# Patient Record
Sex: FEMALE | Race: WHITE | Employment: OTHER | URBAN - METROPOLITAN AREA
[De-identification: names, ages, dates, MRNs, and addresses within clinical notes are randomized per-mention and may not be internally consistent; named-entity substitution may affect disease eponyms.]

---

## 2020-03-04 ENCOUNTER — HOSPITAL ENCOUNTER (OUTPATIENT)
Dept: MAMMOGRAPHY | Age: 70
Discharge: HOME | End: 2020-03-04
Attending: INTERNAL MEDICINE
Payer: MEDICARE

## 2020-03-04 DIAGNOSIS — Z12.31 SCREENING MAMMOGRAM, ENCOUNTER FOR: ICD-10-CM

## 2020-03-04 DIAGNOSIS — M81.0 AGE RELATED OSTEOPOROSIS, UNSPECIFIED PATHOLOGICAL FRACTURE PRESENCE: ICD-10-CM

## 2020-03-04 DIAGNOSIS — M94.9 DISORDER OF BONE AND CARTILAGE: ICD-10-CM

## 2020-03-04 DIAGNOSIS — M89.9 BONE DISORDER: ICD-10-CM

## 2020-03-04 DIAGNOSIS — M85.80 OSTEOPENIA, UNSPECIFIED LOCATION: ICD-10-CM

## 2020-03-04 DIAGNOSIS — Z13.820 SCREENING FOR OSTEOPOROSIS: ICD-10-CM

## 2020-03-04 DIAGNOSIS — M89.9 DISORDER OF BONE AND CARTILAGE: ICD-10-CM

## 2020-03-04 PROCEDURE — 77063 BREAST TOMOSYNTHESIS BI: CPT

## 2020-03-04 PROCEDURE — 77080 DXA BONE DENSITY AXIAL: CPT

## 2020-12-22 ENCOUNTER — TRANSCRIBE ORDER (OUTPATIENT)
Dept: SCHEDULING | Age: 70
End: 2020-12-22

## 2020-12-22 DIAGNOSIS — Z12.31 SCREENING MAMMOGRAM FOR HIGH-RISK PATIENT: Primary | ICD-10-CM

## 2021-03-19 ENCOUNTER — HOSPITAL ENCOUNTER (OUTPATIENT)
Dept: MAMMOGRAPHY | Age: 71
Discharge: HOME | End: 2021-03-19
Attending: INTERNAL MEDICINE
Payer: MEDICARE

## 2021-03-19 DIAGNOSIS — Z12.31 SCREENING MAMMOGRAM FOR HIGH-RISK PATIENT: ICD-10-CM

## 2021-03-19 PROCEDURE — 77063 BREAST TOMOSYNTHESIS BI: CPT

## 2022-01-24 ENCOUNTER — TRANSCRIBE ORDER (OUTPATIENT)
Dept: SCHEDULING | Age: 72
End: 2022-01-24

## 2022-01-24 DIAGNOSIS — Z12.31 VISIT FOR SCREENING MAMMOGRAM: Primary | ICD-10-CM

## 2022-03-23 ENCOUNTER — HOSPITAL ENCOUNTER (OUTPATIENT)
Dept: MAMMOGRAPHY | Age: 72
Discharge: HOME | End: 2022-03-23
Attending: INTERNAL MEDICINE
Payer: MEDICARE

## 2022-03-23 DIAGNOSIS — Z12.31 VISIT FOR SCREENING MAMMOGRAM: ICD-10-CM

## 2022-03-23 PROCEDURE — 77063 BREAST TOMOSYNTHESIS BI: CPT

## 2022-06-20 RX ORDER — IRBESARTAN AND HYDROCHLOROTHIAZIDE 300; 12.5 MG/1; MG/1
1 TABLET, FILM COATED ORAL DAILY
Qty: 90 TABLET | Refills: 3 | Status: SHIPPED | OUTPATIENT
Start: 2022-06-20

## 2022-07-01 ENCOUNTER — OFFICE VISIT (OUTPATIENT)
Dept: INTERNAL MEDICINE CLINIC | Facility: CLINIC | Age: 72
End: 2022-07-01
Payer: MEDICARE

## 2022-07-01 VITALS
SYSTOLIC BLOOD PRESSURE: 130 MMHG | HEIGHT: 60 IN | WEIGHT: 136 LBS | DIASTOLIC BLOOD PRESSURE: 70 MMHG | TEMPERATURE: 98.3 F | OXYGEN SATURATION: 99 % | BODY MASS INDEX: 26.7 KG/M2 | HEART RATE: 77 BPM

## 2022-07-01 DIAGNOSIS — M85.80 OSTEOPENIA, UNSPECIFIED LOCATION: ICD-10-CM

## 2022-07-01 DIAGNOSIS — I10 PRIMARY HYPERTENSION: ICD-10-CM

## 2022-07-01 DIAGNOSIS — F51.04 PSYCHOPHYSIOLOGICAL INSOMNIA: ICD-10-CM

## 2022-07-01 DIAGNOSIS — K21.9 GASTROESOPHAGEAL REFLUX DISEASE WITHOUT ESOPHAGITIS: ICD-10-CM

## 2022-07-01 DIAGNOSIS — M85.89 OTHER SPECIFIED DISORDERS OF BONE DENSITY AND STRUCTURE, MULTIPLE SITES: ICD-10-CM

## 2022-07-01 DIAGNOSIS — G25.81 RESTLESS LEG SYNDROME: Primary | ICD-10-CM

## 2022-07-01 LAB
25(OH)D3 SERPL-MCNC: 72.6 NG/ML (ref 30–100)
ALBUMIN SERPL-MCNC: 3.8 G/DL (ref 3.2–4.6)
ALBUMIN/GLOB SERPL: 1.1 {RATIO} (ref 1.2–3.5)
ALP SERPL-CCNC: 71 U/L (ref 50–136)
ALT SERPL-CCNC: 26 U/L (ref 12–65)
ANION GAP SERPL CALC-SCNC: 6 MMOL/L (ref 7–16)
AST SERPL-CCNC: 16 U/L (ref 15–37)
BASOPHILS # BLD: 0.1 K/UL (ref 0–0.2)
BASOPHILS NFR BLD: 2 % (ref 0–2)
BILIRUB SERPL-MCNC: 0.6 MG/DL (ref 0.2–1.1)
BUN SERPL-MCNC: 23 MG/DL (ref 8–23)
CALCIUM SERPL-MCNC: 9.4 MG/DL (ref 8.3–10.4)
CHLORIDE SERPL-SCNC: 105 MMOL/L (ref 98–107)
CHOLEST SERPL-MCNC: 221 MG/DL
CO2 SERPL-SCNC: 28 MMOL/L (ref 21–32)
CREAT SERPL-MCNC: 1 MG/DL (ref 0.6–1)
DIFFERENTIAL METHOD BLD: ABNORMAL
EOSINOPHIL # BLD: 0.1 K/UL (ref 0–0.8)
EOSINOPHIL NFR BLD: 3 % (ref 0.5–7.8)
ERYTHROCYTE [DISTWIDTH] IN BLOOD BY AUTOMATED COUNT: 12.7 % (ref 11.9–14.6)
FERRITIN SERPL-MCNC: 17 NG/ML (ref 8–388)
GLOBULIN SER CALC-MCNC: 3.4 G/DL (ref 2.3–3.5)
GLUCOSE SERPL-MCNC: 90 MG/DL (ref 65–100)
HCT VFR BLD AUTO: 39.3 % (ref 35.8–46.3)
HDLC SERPL-MCNC: 109 MG/DL (ref 40–60)
HDLC SERPL: 2 {RATIO}
HGB BLD-MCNC: 12.6 G/DL (ref 11.7–15.4)
IMM GRANULOCYTES # BLD AUTO: 0 K/UL (ref 0–0.5)
IMM GRANULOCYTES NFR BLD AUTO: 0 % (ref 0–5)
IRON SERPL-MCNC: 88 UG/DL (ref 35–150)
LDLC SERPL CALC-MCNC: 103.8 MG/DL
LYMPHOCYTES # BLD: 1.5 K/UL (ref 0.5–4.6)
LYMPHOCYTES NFR BLD: 36 % (ref 13–44)
MAGNESIUM SERPL-MCNC: 2.4 MG/DL (ref 1.8–2.4)
MCH RBC QN AUTO: 29.1 PG (ref 26.1–32.9)
MCHC RBC AUTO-ENTMCNC: 32.1 G/DL (ref 31.4–35)
MCV RBC AUTO: 90.8 FL (ref 79.6–97.8)
MONOCYTES # BLD: 0.5 K/UL (ref 0.1–1.3)
MONOCYTES NFR BLD: 11 % (ref 4–12)
NEUTS SEG # BLD: 2 K/UL (ref 1.7–8.2)
NEUTS SEG NFR BLD: 48 % (ref 43–78)
NRBC # BLD: 0 K/UL (ref 0–0.2)
PLATELET # BLD AUTO: 280 K/UL (ref 150–450)
PMV BLD AUTO: 10.4 FL (ref 9.4–12.3)
POTASSIUM SERPL-SCNC: 4 MMOL/L (ref 3.5–5.1)
PROT SERPL-MCNC: 7.2 G/DL (ref 6.3–8.2)
RBC # BLD AUTO: 4.33 M/UL (ref 4.05–5.2)
SODIUM SERPL-SCNC: 139 MMOL/L (ref 136–145)
TRIGL SERPL-MCNC: 41 MG/DL (ref 35–150)
TSH, 3RD GENERATION: 1.53 UIU/ML (ref 0.36–3.74)
VIT B12 SERPL-MCNC: 414 PG/ML (ref 193–986)
VLDLC SERPL CALC-MCNC: 8.2 MG/DL (ref 6–23)
WBC # BLD AUTO: 4.2 K/UL (ref 4.3–11.1)

## 2022-07-01 PROCEDURE — 3017F COLORECTAL CA SCREEN DOC REV: CPT | Performed by: INTERNAL MEDICINE

## 2022-07-01 PROCEDURE — 1036F TOBACCO NON-USER: CPT | Performed by: INTERNAL MEDICINE

## 2022-07-01 PROCEDURE — 99214 OFFICE O/P EST MOD 30 MIN: CPT | Performed by: INTERNAL MEDICINE

## 2022-07-01 PROCEDURE — G8399 PT W/DXA RESULTS DOCUMENT: HCPCS | Performed by: INTERNAL MEDICINE

## 2022-07-01 PROCEDURE — 1123F ACP DISCUSS/DSCN MKR DOCD: CPT | Performed by: INTERNAL MEDICINE

## 2022-07-01 PROCEDURE — G8427 DOCREV CUR MEDS BY ELIG CLIN: HCPCS | Performed by: INTERNAL MEDICINE

## 2022-07-01 PROCEDURE — G8417 CALC BMI ABV UP PARAM F/U: HCPCS | Performed by: INTERNAL MEDICINE

## 2022-07-01 PROCEDURE — 1090F PRES/ABSN URINE INCON ASSESS: CPT | Performed by: INTERNAL MEDICINE

## 2022-07-01 RX ORDER — IRBESARTAN AND HYDROCHLOROTHIAZIDE 300; 12.5 MG/1; MG/1
1 TABLET, FILM COATED ORAL DAILY
Qty: 90 TABLET | Refills: 3 | Status: CANCELLED | OUTPATIENT
Start: 2022-07-01

## 2022-07-01 RX ORDER — OMEPRAZOLE 20 MG/1
20 CAPSULE, DELAYED RELEASE ORAL DAILY
Qty: 90 CAPSULE | Refills: 3 | Status: SHIPPED | OUTPATIENT
Start: 2022-07-01

## 2022-07-01 RX ORDER — ACETAMINOPHEN 160 MG
TABLET,DISINTEGRATING ORAL
COMMUNITY

## 2022-07-01 RX ORDER — RALOXIFENE HYDROCHLORIDE 60 MG/1
60 TABLET, FILM COATED ORAL DAILY
Qty: 90 TABLET | Refills: 3 | Status: SHIPPED | OUTPATIENT
Start: 2022-07-01

## 2022-07-01 ASSESSMENT — PATIENT HEALTH QUESTIONNAIRE - PHQ9
SUM OF ALL RESPONSES TO PHQ QUESTIONS 1-9: 0
2. FEELING DOWN, DEPRESSED OR HOPELESS: 0
SUM OF ALL RESPONSES TO PHQ9 QUESTIONS 1 & 2: 0
SUM OF ALL RESPONSES TO PHQ QUESTIONS 1-9: 0
SUM OF ALL RESPONSES TO PHQ QUESTIONS 1-9: 0
1. LITTLE INTEREST OR PLEASURE IN DOING THINGS: 0
SUM OF ALL RESPONSES TO PHQ QUESTIONS 1-9: 0

## 2022-07-01 ASSESSMENT — ENCOUNTER SYMPTOMS
SINUS PAIN: 0
VOMITING: 0
WHEEZING: 0
NAUSEA: 0
CONSTIPATION: 0
DIARRHEA: 0
ABDOMINAL PAIN: 0
SHORTNESS OF BREATH: 0

## 2022-07-15 ENCOUNTER — COMMUNITY OUTREACH (OUTPATIENT)
Dept: INTERNAL MEDICINE CLINIC | Facility: CLINIC | Age: 72
End: 2022-07-15

## 2022-07-15 NOTE — PROGRESS NOTES
Patient's HM shows they are current for Colorectal Screening. CareEverywhere and  files searched with success. Results attached to order and HM updated.      Colonoscopy found in Care Everywhere

## 2022-12-18 ENCOUNTER — PATIENT MESSAGE (OUTPATIENT)
Dept: INTERNAL MEDICINE CLINIC | Facility: CLINIC | Age: 72
End: 2022-12-18

## 2022-12-18 DIAGNOSIS — I10 PRIMARY HYPERTENSION: ICD-10-CM

## 2022-12-18 DIAGNOSIS — G25.81 RESTLESS LEG SYNDROME: ICD-10-CM

## 2022-12-18 DIAGNOSIS — M85.80 OSTEOPENIA, UNSPECIFIED LOCATION: ICD-10-CM

## 2022-12-19 RX ORDER — RALOXIFENE HYDROCHLORIDE 60 MG/1
60 TABLET, FILM COATED ORAL DAILY
Qty: 90 TABLET | Refills: 3 | Status: SHIPPED | OUTPATIENT
Start: 2022-12-19

## 2022-12-19 NOTE — TELEPHONE ENCOUNTER
From: Tien Reyes  To: Dr. Kennedy Damian: 12/18/2022 3:42 PM EST  Subject: raloxifene 60 MG tablet    Helmaureen Washburn on [address] would like to refill my prescription. Could you please contact them so I can get a refil there? Thank you! Colten Lemus!

## 2023-01-16 ENCOUNTER — OFFICE VISIT (OUTPATIENT)
Dept: INTERNAL MEDICINE CLINIC | Facility: CLINIC | Age: 73
End: 2023-01-16
Payer: MEDICARE

## 2023-01-16 ENCOUNTER — NURSE TRIAGE (OUTPATIENT)
Dept: OTHER | Facility: CLINIC | Age: 73
End: 2023-01-16

## 2023-01-16 VITALS
DIASTOLIC BLOOD PRESSURE: 70 MMHG | HEIGHT: 60 IN | SYSTOLIC BLOOD PRESSURE: 120 MMHG | WEIGHT: 137 LBS | BODY MASS INDEX: 26.9 KG/M2 | HEART RATE: 75 BPM | OXYGEN SATURATION: 99 %

## 2023-01-16 DIAGNOSIS — M79.2 RADICULAR PAIN IN LEFT ARM: ICD-10-CM

## 2023-01-16 DIAGNOSIS — Z78.0 POSTMENOPAUSAL: ICD-10-CM

## 2023-01-16 DIAGNOSIS — Z12.31 ENCOUNTER FOR SCREENING MAMMOGRAM FOR MALIGNANT NEOPLASM OF BREAST: ICD-10-CM

## 2023-01-16 DIAGNOSIS — M54.2 NECK PAIN ON LEFT SIDE: Primary | ICD-10-CM

## 2023-01-16 PROCEDURE — G8399 PT W/DXA RESULTS DOCUMENT: HCPCS | Performed by: NURSE PRACTITIONER

## 2023-01-16 PROCEDURE — 3017F COLORECTAL CA SCREEN DOC REV: CPT | Performed by: NURSE PRACTITIONER

## 2023-01-16 PROCEDURE — 1090F PRES/ABSN URINE INCON ASSESS: CPT | Performed by: NURSE PRACTITIONER

## 2023-01-16 PROCEDURE — 1123F ACP DISCUSS/DSCN MKR DOCD: CPT | Performed by: NURSE PRACTITIONER

## 2023-01-16 PROCEDURE — 1036F TOBACCO NON-USER: CPT | Performed by: NURSE PRACTITIONER

## 2023-01-16 PROCEDURE — G8484 FLU IMMUNIZE NO ADMIN: HCPCS | Performed by: NURSE PRACTITIONER

## 2023-01-16 PROCEDURE — G8427 DOCREV CUR MEDS BY ELIG CLIN: HCPCS | Performed by: NURSE PRACTITIONER

## 2023-01-16 PROCEDURE — 99213 OFFICE O/P EST LOW 20 MIN: CPT | Performed by: NURSE PRACTITIONER

## 2023-01-16 PROCEDURE — G8417 CALC BMI ABV UP PARAM F/U: HCPCS | Performed by: NURSE PRACTITIONER

## 2023-01-16 SDOH — ECONOMIC STABILITY: FOOD INSECURITY: WITHIN THE PAST 12 MONTHS, YOU WORRIED THAT YOUR FOOD WOULD RUN OUT BEFORE YOU GOT MONEY TO BUY MORE.: NEVER TRUE

## 2023-01-16 SDOH — ECONOMIC STABILITY: FOOD INSECURITY: WITHIN THE PAST 12 MONTHS, THE FOOD YOU BOUGHT JUST DIDN'T LAST AND YOU DIDN'T HAVE MONEY TO GET MORE.: NEVER TRUE

## 2023-01-16 ASSESSMENT — PATIENT HEALTH QUESTIONNAIRE - PHQ9
SUM OF ALL RESPONSES TO PHQ QUESTIONS 1-9: 0
SUM OF ALL RESPONSES TO PHQ QUESTIONS 1-9: 0
1. LITTLE INTEREST OR PLEASURE IN DOING THINGS: 0
SUM OF ALL RESPONSES TO PHQ QUESTIONS 1-9: 0
SUM OF ALL RESPONSES TO PHQ QUESTIONS 1-9: 0
2. FEELING DOWN, DEPRESSED OR HOPELESS: 0
SUM OF ALL RESPONSES TO PHQ9 QUESTIONS 1 & 2: 0

## 2023-01-16 ASSESSMENT — SOCIAL DETERMINANTS OF HEALTH (SDOH): HOW HARD IS IT FOR YOU TO PAY FOR THE VERY BASICS LIKE FOOD, HOUSING, MEDICAL CARE, AND HEATING?: NOT HARD AT ALL

## 2023-01-16 NOTE — TELEPHONE ENCOUNTER
Location of patient: [place]    Received call from [facility] at [facility] with [employer] Complaint. Subjective: Caller states \"It was probably in December. It's not life threatening. The tingling in my left two fingers in my left hand, every once in awhile. At night sometimes I notice it when I get into bed. It's like a burning. I know it's a nerve thing because I've had that before. I've had back surgeries on  my L4 and L5. I had some synovial cysts. \"     Current Symptoms: intermittent tingling/burning in pinky and ring fingers of left hand-NO weakness or numbness, NO back pain, NO neck stiffness    Hx of back surgeries, synovial     Onset: 1 month ago; intermittent    Associated Symptoms: NA    Pain Severity: Denies    Temperature: Denies    What has been tried: stretching, moving head back    LMP: NA Pregnant: NA    Recommended disposition: Go to Office Now. Patient agreeable. Care advice provided, patient verbalizes understanding; denies any other questions or concerns; instructed to call back for any new or worsening symptoms. Patient/Caller agrees with recommended disposition; writer provided warm transfer to [facility] [employer] at [facility] for appointment scheduling. Attention Provider: Thank you for allowing me to participate in the care of your patient. The patient was connected to triage in response to information provided to the ECC/PSC. Please do not respond through this encounter as the response is not directed to a shared pool.       Reason for Disposition   Neurologic deficit of gradual onset (e.g., days to weeks), ANY of the following: * Weakness of the face, arm, or leg on one side of the body* Numbness of the face, arm, or leg on one side of the body* Loss of speech or garbled speech    Protocols used: Neurologic Deficit-ADULT-[place]

## 2023-01-16 NOTE — PROGRESS NOTES
Rosalind Meza (:  1950) is a 67 y.o. female,Established patient, here for evaluation of the following chief complaint(s):  Hand Injury (Numbness and tingling of left hand /)         ASSESSMENT/PLAN:  1. Neck pain on left side  -     XR CERVICAL SPINE (4 OR 5 VIEWS); Future  -     [facility]  2. Postmenopausal  -     DEXA BONE DENSITY AXIAL SKELETON; Future  3. Encounter for screening mammogram for malignant neoplasm of breast  -     LIZA DIGITAL SCREEN W OR WO CAD BILATERAL; Future  4. Radicular pain in left arm    No follow-ups on file. Patient with neck pain, radicular pain with turning head to the left  Get xray  Refer to PT  Get mammogram and dexa        Subjective   SUBJECTIVE/OBJECTIVE:  Patient is here for tingling in her left fifth finger up her arm to her neck. She has had 2 back surgeries in . She has had neck issues with stiffness turning neck. She went to PT and chiropractor in the past and would get help and relief for her neck in the past. The tingling in the left finger is annoying. When she lays in the bed at night the fingers will tingle and she has to readjust position. She would like to see pT  It is tingling when she does computer work or when she lays in the bed. Review of Systems       Objective   Physical Exam  Constitutional:       Appearance: Normal appearance. HENT:      Head: Normocephalic. Right Ear: External ear normal.      Left Ear: External ear normal.   Eyes:      Extraocular Movements: Extraocular movements intact. Pupils: Pupils are equal, round, and reactive to light. Cardiovascular:      Rate and Rhythm: Normal rate and regular rhythm. Pulses: Normal pulses. Pulmonary:      Effort: Pulmonary effort is normal.   Musculoskeletal:      Cervical back: Neck supple. Neurological:      General: No focal deficit present. Mental Status: She is alert and oriented to person, place, and time. Mental status is at baseline. Sensory: No sensory deficit. Motor: No weakness. Coordination: Coordination normal.   Psychiatric:         Mood and Affect: Mood normal.         Behavior: Behavior normal.              An electronic signature was used to authenticate this note.     --AMADEO Mosquera - CNP

## 2023-01-17 ENCOUNTER — TRANSCRIBE ORDERS (OUTPATIENT)
Dept: SCHEDULING | Age: 73
End: 2023-01-17

## 2023-01-17 DIAGNOSIS — Z12.31 VISIT FOR SCREENING MAMMOGRAM: Primary | ICD-10-CM

## 2023-01-19 ENCOUNTER — HOSPITAL ENCOUNTER (OUTPATIENT)
Dept: PHYSICAL THERAPY | Age: 73
Setting detail: RECURRING SERIES
Discharge: HOME | End: 2023-01-22
Payer: MEDICARE

## 2023-01-19 PROCEDURE — 97110 THERAPEUTIC EXERCISES: CPT

## 2023-01-19 PROCEDURE — 97140 MANUAL THERAPY 1/> REGIONS: CPT

## 2023-01-19 PROCEDURE — 97162 PT EVAL MOD COMPLEX 30 MIN: CPT

## 2023-01-19 ASSESSMENT — PAIN SCALES - GENERAL: PAINLEVEL_OUTOF10: 1

## 2023-01-19 NOTE — PLAN OF CARE
Donita Lutz  : 1950  Primary: Medicare Part A And B (Medicare)  Secondary: [address] @ Marlee Lopez [address] [place] [address]  Phone: [phone]  Fax: [phone] Plan Frequency: 2 times/week  Plan of Care/Certification Expiration Date: 23    PT Visit Info:  Plan Frequency: 2 times/week  Plan of Care/Certification Expiration Date: 23    Visit Count:  1                OUTPATIENT PHYSICAL THERAPY:             OP NOTE TYPE: Initial Assessment 2023               Episode (neck pain) Appt Desk         Treatment Diagnosis:   numbness L UE  Cervicalgia (M54.2)  Medical/Referring Diagnosis:  Neck pain on left side [M54.2]  Referring Physician:  AMDAEO Padgett CNP, MD Orders:  PT Eval and Treat   Return MD Appt:  unknown  Date of Onset:       Allergies:  Patient has no known allergies. Restrictions/Precautions:           Medications Last Reviewed:  2023     SUBJECTIVE   History of Injury/Illness (Reason for Referral):  Pt reports tingling in 4th,5th fingers up to elbow. Does have neck stiffness. Sxs started in Dec.  No previous hx. At night can't get comfortable, aching in lat shoulder down the arm. 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 Patient Stated Goal(s):  \"relieve sxs\"  Initial:     1/10 Post Session:     1/10  Past Medical History/Comorbidities:   Ms. Melissa Mireles  has a past medical history of Hypertension, Insomnia, Osteopenia, and Restless leg syndrome. Ms. Melissa Mireles  has a past surgical history that includes Cataract removal (2019); Cholecystectomy; gyn (); and orthopedic surgery ().   Social History/Living Environment:   Type of Home: House  Home Layout: One level     Prior Level of Function/Work/Activity:   Occupation: Retired           Learning:   Does the patient/guardian have any barriers to learning?: No barriers  Will there be a co-learner?: No  What is the preferred language of the patient/guardian?: English  Is an  required?: No  How does the patient/guardian prefer to learn new concepts?: Listening; Reading; Demonstration; Pictures/Videos     Fall Risk Scale: Metcalf Total Score: 0  Metcalf Fall Risk: Low (0-24)     Dominant Side:  right handed      OBJECTIVE     Observation, Palpation, and Special Tests   Diminished L C7 DTR  Tightness B UT, levator, UT, scalenes     ROM   Cervical rotation:    R                                                  L                               0-50 before, 0-60 after             0-55 before, 0-67 after  Shoulder WNL     Strength   L thumb ext 4-/5, finger abd 4-/5  Others 5/5        ASSESSMENT   Initial Assessment:  pt presents with L UE N/T in ulnar nerve distribution with some cervical stiffness, weakness. She will benefit from skilled PT to address these deficits to return pt to premorbid level. Problem List: (Impacting functional limitations): Body Structures, Functions, Activity Limitations Requiring Skilled Therapeutic Intervention: Decreased functional mobility ; Decreased ROM; Decreased strength;  Increased pain     Therapy Prognosis:   Therapy Prognosis: Good     Initial Assessment Complexity:   Decision Making: Medium Complexity    PLAN   Effective Dates: 1/19/23 TO Plan of Care/Certification Expiration Date: 04/19/23   Frequency/Duration: Plan Frequency: 2 times/week   Interventions Planned (Treatment may consist of any combination of the following):    Current Treatment Recommendations: Strengthening; ROM; Manual; Pain management; Home exercise program     Goals: (Goals have been discussed and agreed upon with patient.)  Short-Term Functional Goals: Time Frame: 4 weeks  Pt to report compliance with HEP  Pt to restore 70 degrees B cervical rotations for safe driving without turning body  Discharge Goals: Time Frame: 12 weeks  Pt to report restorative sleep patterns  Pt to report min to no radiating sxs  Pt to resume normal housekeeping and cooking tasks without issues         Outcome Measure: Tool Used: Neck Disability Index (NDI)  Score:  Initial: 5/50  Most Recent: X/50 (Date: -- )   Interpretation of Score: The Neck Disability Index is a revised form of the Oswestry Low Back Pain Index and is designed to measure the activities of daily living in person's with neck pain. Each section is scored on a 0-5 scale, 5 representing the greatest disability. The scores of each section are added together for a total score of 50. Medical Necessity:   > Patient demonstrates good rehab potential due to higher previous functional level. Reason For Services/Other Comments:  > Patient continues to require skilled intervention due to inability to function sleep without L radicular arm sxs. Total Duration:  Time In: 0345  Time Out: 0500    Regarding Josey Black's therapy, I certify that the treatment plan above will be carried out by a therapist or under their direction.   Thank you for this referral,  Bran Turner, PT     Referring Physician Signature: Devi Huntley, *         Post Session Pain  Charge Capture  PT Visit Info MD Guidelines  Lola

## 2023-01-19 NOTE — PROGRESS NOTES
Ho Velasco  : 1950  Primary: Medicare Part A And B (Medicare)  Secondary: [address]  [facility] [address]  Phone: [phone]  Fax: [phone] Plan Frequency: 2 times/week    Plan of Care/Certification Expiration Date: 23      PT Visit Info:  Plan Frequency: 2 times/week  Plan of Care/Certification Expiration Date: 23      Visit Count:  1    OUTPATIENT PHYSICAL THERAPY:OP NOTE TYPE: Treatment Note 2023       Episode  }Appt Desk             Treatment Diagnosis:  numbness L UE  Cervicalgia (M54.2)    Goals: (Goals have been discussed and agreed upon with patient.)  Short-Term Functional Goals: Time Frame: 4 weeks  Pt to report compliance with HEP  Pt to restore 70 degrees B cervical rotations for safe driving without turning body  Discharge Goals: Time Frame: 12 weeks  Pt to report restorative sleep patterns  Pt to report min to no radiating sxs  Pt to resume normal housekeeping and cooking tasks without issues    Medical/Referring Diagnosis:  Neck pain on left side [M54.2]  Referring Physician:  AMADEO Ramirez CNP, MD Orders:  PT Eval and Treat   Date of Onset:  No data recorded   Allergies:   Patient has no known allergies. Restrictions/Precautions:  No data recorded  No data recorded   Interventions Planned (Treatment may consist of any combination of the following):    Current Treatment Recommendations: Strengthening; ROM; Manual; Pain management; Home exercise program     Subjective Comments: pt presents with L UE N/T in ulnar nerve distribution with some cervical stiffness, weakness.     Initial: 1/10                         Post Session:  1/10  Medications Last Reviewed:  2023  Updated Objective Findings:  See evaluation note from today    Observation, Palpation, and Special Tests   Diminished L C7 DTR  Tightness B UT, levator, UT, scalenes      ROM   Cervical rotation:    R L                               0-50 before, 0-60 after             0-55 before, 0-67 after  Shoulder WNL      Strength   L thumb ext 4-/5, finger abd 4-/5  Others 5/5     Outcome Measure: Tool Used: Neck Disability Index (NDI)  Score:  Initial: 5/50  Most Recent: X/50 (Date: -- )   Interpretation of Score: The Neck Disability Index is a revised form of the Oswestry Low Back Pain Index and is designed to measure the activities of daily living in person's with neck pain. Each section is scored on a 0-5 scale, 5 representing the greatest disability. The scores of each section are added together for a total score of 50. Treatment   THERAPEUTIC ACTIVITY: ( see below for minutes): Therapeutic activities per grid below to improve mobility. Required moderate verbal and manual cues to improve functional mobility . THERAPEUTIC EXERCISE: (see below for minutes):  Exercises per grid below to improve strength. Required moderate verbal and manual cues to promote proper body alignment, promote proper body posture, promote proper body mechanics and promote proper body breathing techniques. Progressed resistance, range, repetitions and complexity of movement as indicated. MANUAL THERAPY: (see below for minutes): Joint mobilization and Soft tissue mobilization was utilized and necessary because of the patient's restricted joint motion, painful spasm, loss of articular motion and restricted motion of soft tissue. MODALITIES: (see below for minutes):      for pain modulation    AQUATIC THERAPY (see below for minutes): Aquatic treatment performed per flow grid for Decreased muscle strength, Decreased endurance, Decreased static/dynamic balance and reactive control, Decreased activity endurance, Decompression, Ease of movement and Low impact and reduced weight bearing activity.                                           Date: 1/19/23       Modalities:                        Manual Therapy: 10 mins       STM cervical supine               Aquatics Activities:                Therapeutic Exercises: 35 mins       Pt education, postural education, HEP, functional breathing, sleep hygiene 20 mins       UT stretch Passive,  active       Levator stretch Passive,  active       PROM cervical rotations supine       Chin tuck seated               HEP: see hand out       Treatment/Session Summary:    Treatment Assessment: Pt had good understanding and corby to information presented. Communication/Consultation:  None today  Equipment provided today:  None  Recommendations/Intent for next treatment session: Next visit will focus on stretching, manual techniques, strengthening as indicated.     Total Treatment Billable Duration:  60 minutes  Time In: 0345  Time Out: 0500    Sulaiman Gallo, PT       Charge Capture  }Post Session Pain  PT Visit Info  Con[facility] Portal  MD Guidelines  Scanned Media  Benefits  Roseonlyhart    Future Appointments   Date Time Provider Bran Olea   1/23/2023  8:45 AM [facility]   1/25/2023  8:00 AM Heber Booty, PT [facility]   2/1/2023  8:45 AM Clarissa Baron, PT [facility]   2/2/2023  8:00 AM Heber Booty, PT [facility]   2/6/2023  8:45 AM Heber Booty, PT [facility]   2/8/2023  8:45 AM Heber Booty, PT [facility]   2/13/2023  8:45 AM Heber Booty, PT [facility]   2/15/2023  9:30 AM Heber Booty, PT [facility]   2/20/2023  8:45 AM Heber Booty, PT [facility]   2/22/2023  8:45 AM Heber Booty, PT [facility]   2/27/2023  8:45 AM Heber Booty, PT [facility]   3/1/2023  8:45 AM Heber Booty, PT [facility]   3/24/2023 11:30 AM SFE DEXA BI GE LUNAR DEXA [facility] E   3/24/2023 12:00 PM SFE LIZA BI ROOM 4 [facility]   6/26/2023  8:45 AM TRIM LAB RESOURCE TRIM [facility] AMB   7/3/2023  9:00 AM DO JAMIE Pickett AMB

## 2023-01-23 ENCOUNTER — HOSPITAL ENCOUNTER (OUTPATIENT)
Dept: PHYSICAL THERAPY | Age: 73
Setting detail: RECURRING SERIES
Discharge: HOME | End: 2023-01-26
Payer: MEDICARE

## 2023-01-23 PROCEDURE — 97140 MANUAL THERAPY 1/> REGIONS: CPT

## 2023-01-23 PROCEDURE — 97110 THERAPEUTIC EXERCISES: CPT

## 2023-01-23 NOTE — PROGRESS NOTES
Angel Eaton  : 1950  Primary: Medicare Part A And B (Medicare)  Secondary: [address] @ [address]  [facility] [address]  Phone: [phone]  Fax: [phone] Plan Frequency: 2 times/week    Plan of Care/Certification Expiration Date: 23      PT Visit Info:  Plan Frequency: 2 times/week  Plan of Care/Certification Expiration Date: 23      Visit Count:  2    OUTPATIENT PHYSICAL THERAPY:OP NOTE TYPE: Treatment Note 2023       Episode  }Appt Desk             Treatment Diagnosis:  numbness L UE  Cervicalgia (M54.2)    Goals: (Goals have been discussed and agreed upon with patient.)  Short-Term Functional Goals: Time Frame: 4 weeks  Pt to report compliance with HEP  Pt to restore 70 degrees B cervical rotations for safe driving without turning body  Discharge Goals: Time Frame: 12 weeks  Pt to report restorative sleep patterns  Pt to report min to no radiating sxs  Pt to resume normal housekeeping and cooking tasks without issues    Medical/Referring Diagnosis:  Neck pain on left side [M54.2]  Referring Physician:  AMADEO Mendez CNP, MD Orders:  PT Eval and Treat   Date of Onset:  No data recorded   Allergies:   Patient has no known allergies. Restrictions/Precautions:  No data recorded  No data recorded   Interventions Planned (Treatment may consist of any combination of the following):    Current Treatment Recommendations: Strengthening; ROM; Manual; Pain management; Home exercise program     Subjective Comments: I get tingling in the L arm when I lie on the R side. It goes away when I am on the L.   Initial: 1/10                         Post Session:  no increased pain with ex's  Medications Last Reviewed:  2023  Updated Objective Findings:      Observation, Palpation, and Special Tests   Diminished L C7 DTR  Tightness B UT, levator, UT, scalenes      ROM                23   Cervical rotation:    R 0-57 before            0-65 before  Shoulder WNL      Strength   L thumb ext 4-/5, finger abd 4-/5  Others 5/5     Outcome Measure: Tool Used: Neck Disability Index (NDI)  Score:  Initial: 5/50  Most Recent: X/50 (Date: -- )   Interpretation of Score: The Neck Disability Index is a revised form of the Oswestry Low Back Pain Index and is designed to measure the activities of daily living in person's with neck pain. Each section is scored on a 0-5 scale, 5 representing the greatest disability. The scores of each section are added together for a total score of 50. Treatment   THERAPEUTIC ACTIVITY: ( see below for minutes): Therapeutic activities per grid below to improve mobility. Required moderate verbal and manual cues to improve functional mobility . THERAPEUTIC EXERCISE: (see below for minutes):  Exercises per grid below to improve strength. Required moderate verbal and manual cues to promote proper body alignment, promote proper body posture, promote proper body mechanics and promote proper body breathing techniques. Progressed resistance, range, repetitions and complexity of movement as indicated. MANUAL THERAPY: (see below for minutes): Joint mobilization and Soft tissue mobilization was utilized and necessary because of the patient's restricted joint motion, painful spasm, loss of articular motion and restricted motion of soft tissue. MODALITIES: (see below for minutes):      for pain modulation    AQUATIC THERAPY (see below for minutes): Aquatic treatment performed per flow grid for Decreased muscle strength, Decreased endurance, Decreased static/dynamic balance and reactive control, Decreased activity endurance, Decompression, Ease of movement and Low impact and reduced weight bearing activity.                                           Date: 1/19/23 1/23/23  Visit 2      Modalities:                        Manual Therapy: 10 mins 15 mins STM cervical supine Supine, seated      Cervical distraction  supine              Aquatics Activities:                Therapeutic Exercises: 35 mins 30 mins      Pt education, postural education, HEP, functional breathing, sleep hygiene 20 mins       UT stretch Passive,  active passive      Levator stretch Passive,  active passive      Scalene, SCM stretches B  passive      PROM cervical rotations supine passive      Chin tuck seated supine              HEP: see hand out       Treatment/Session Summary:    Treatment Assessment: Good increase in cervical rotations. Mild reproduction of sxs with L SB/rotation. Communication/Consultation:  None today  Equipment provided today:  None  Recommendations/Intent for next treatment session: Next visit will focus on stretching, manual techniques, strengthening as indicated.     Total Treatment Billable Duration:  45 minutes  Time In: 0845  Time Out: 0930    Neeru Damon, PT       Charge Capture  }Post Session Pain  PT Visit Info  Med[employer] Portal  MD Guidelines  Scanned Media  Benefits  MyChart    Future Appointments   Date Time Provider Bran Olea   1/25/2023  8:00 AM Rolando Herron, PT [facility]   2/1/2023  8:45 AM Rolando Herron, PT [facility]   2/2/2023  8:00 AM Rolando Herron, PT [facility]   2/6/2023  8:45 AM Rolando Herron, PT [facility]   2/8/2023  8:45 AM Rolando Herron, PT [facility]   2/13/2023  8:45 AM Rolando Herron, PT [facility]   2/15/2023  9:30 AM Rolando Herron, PT [facility]   2/20/2023  8:45 AM Rolando Herron, PT [facility]   2/22/2023  8:45 AM Rolando Herron, PT [facility]   2/27/2023  8:45 AM Rolando Herron, PT [facility]   3/1/2023  8:45 AM Rolando Herron, PT [facility]   3/24/2023 11:30 AM SFE DEXA BI GE LUNAR DEXA [facility]   3/24/2023 12:00 PM SFE LIZA BI ROOM 4 [facility] SFE   6/26/2023  8:45 AM TRIM LAB RESOURCE TRIM [facility] AMB   7/3/2023  9:00 AM Rigo Shukla,  TRIM [facility] AMB

## 2023-01-25 ENCOUNTER — HOSPITAL ENCOUNTER (OUTPATIENT)
Dept: PHYSICAL THERAPY | Age: 73
Setting detail: RECURRING SERIES
Discharge: HOME | End: 2023-01-28
Payer: MEDICARE

## 2023-01-25 PROCEDURE — 97140 MANUAL THERAPY 1/> REGIONS: CPT

## 2023-01-25 PROCEDURE — 97110 THERAPEUTIC EXERCISES: CPT

## 2023-01-25 NOTE — PROGRESS NOTES
Ho Velasco  : 1950  Primary: Medicare Part A And B (Medicare)  Secondary: [address] @ [address]  [facility] [address]  Phone: [phone]  Fax: [phone] Plan Frequency: 2 times/week    Plan of Care/Certification Expiration Date: 23      PT Visit Info:  Plan Frequency: 2 times/week  Plan of Care/Certification Expiration Date: 23      Visit Count:  3    OUTPATIENT PHYSICAL THERAPY:OP NOTE TYPE: Treatment Note 2023       Episode  }Appt Desk             Treatment Diagnosis:  numbness L UE  Cervicalgia (M54.2)    Goals: (Goals have been discussed and agreed upon with patient.)  Short-Term Functional Goals: Time Frame: 4 weeks  Pt to report compliance with HEP  Pt to restore 70 degrees B cervical rotations for safe driving without turning body  Discharge Goals: Time Frame: 12 weeks  Pt to report restorative sleep patterns  Pt to report min to no radiating sxs  Pt to resume normal housekeeping and cooking tasks without issues    Medical/Referring Diagnosis:  Neck pain on left side [M54.2]  Referring Physician:  AMADEO Ramirez CNP, MD Orders:  PT Eval and Treat   Date of Onset:  No data recorded   Allergies:   Patient has no known allergies. Restrictions/Precautions:  No data recorded  No data recorded   Interventions Planned (Treatment may consist of any combination of the following):    Current Treatment Recommendations: Strengthening; ROM; Manual; Pain management; Home exercise program     Subjective Comments: My neck is moving so much more. I still get tingling. It happens at all kinds of times. I am resting better.    Initial: 1/10                         Post Session:  no increased pain with ex's  Medications Last Reviewed:  2023  Updated Objective Findings:      Observation, Palpation, and Special Tests   Diminished L C7 DTR  Tightness B UT, levator, UT, scalenes      ROM                23 Cervical rotation:    R                                                               0-55 before            0-70 before  Shoulder WNL      Strength   L thumb ext 4-/5, finger abd 4-/5  Others 5/5     Outcome Measure: Tool Used: Neck Disability Index (NDI)  Score:  Initial: 5/50  Most Recent: X/50 (Date: -- )   Interpretation of Score: The Neck Disability Index is a revised form of the Oswestry Low Back Pain Index and is designed to measure the activities of daily living in person's with neck pain. Each section is scored on a 0-5 scale, 5 representing the greatest disability. The scores of each section are added together for a total score of 50. Treatment   THERAPEUTIC ACTIVITY: ( see below for minutes): Therapeutic activities per grid below to improve mobility. Required moderate verbal and manual cues to improve functional mobility . THERAPEUTIC EXERCISE: (see below for minutes):  Exercises per grid below to improve strength. Required moderate verbal and manual cues to promote proper body alignment, promote proper body posture, promote proper body mechanics and promote proper body breathing techniques. Progressed resistance, range, repetitions and complexity of movement as indicated. MANUAL THERAPY: (see below for minutes): Joint mobilization and Soft tissue mobilization was utilized and necessary because of the patient's restricted joint motion, painful spasm, loss of articular motion and restricted motion of soft tissue. MODALITIES: (see below for minutes):      for pain modulation    AQUATIC THERAPY (see below for minutes): Aquatic treatment performed per flow grid for Decreased muscle strength, Decreased endurance, Decreased static/dynamic balance and reactive control, Decreased activity endurance, Decompression, Ease of movement and Low impact and reduced weight bearing activity.                                           Date: 1/19/23 1/23/23  Visit 2 1/25/23  Visit 3     Modalities: Manual Therapy: 10 mins 15 mins 20 mins     STM cervical supine Supine, seated Supine, seated     Cervical distraction  supine supine             Aquatics Activities:                Therapeutic Exercises: 35 mins 30 mins 40 mins     Pt education, postural education, HEP, functional breathing, sleep hygiene 20 mins  Checked sleeping posture with pillows from home     UT stretch Passive,  active passive passive     Levator stretch Passive,  active passive passive     Scalene, SCM stretches B  passive passive     PROM cervical rotations supine passive passive     Chin tuck seated supine supine             HEP: see hand out       Treatment/Session Summary:    Treatment Assessment: Improved ROM and sxs reduction. Some improved strength in finger abd and thumb ext. Pt has good pillow height and neck support. She demonstrated lying prone with head rotated L which triggered tingling L UE. Reminded pt that L rotation/SB/ext are postures to avoid as these are provocative. She did not associate L rotation in prone as being the same as L rotation in sitting. Communication/Consultation:  None today  Equipment provided today:  None  Recommendations/Intent for next treatment session: Next visit will focus on stretching, manual techniques, strengthening as indicated.     Total Treatment Billable Duration:  60 minutes  Time In: 0800  Time Out: 0900    Sherryle Pringle, PT       Charge Capture  }Post Session Pain  PT Visit Info  [facility] Portal  MD Guidelines  Scanned Media  Benefits  MyChart    Future Appointments   Date Time Provider Bran Olea   2/1/2023  8:45 AM Nani Davis, PT [facility]   2/2/2023  8:00 AM Nani Davis, PT [facility]   2/6/2023  [facility]   2/8/2023  8:45 AM Nani Susan, PT [facility]   2/13/2023  8:45 AM Nani Susan, PT [facility]   2/15/2023  9:30 AM Nani [place], PT [facility]   2/20/2023  8:45 AM [facility]   2/22/2023  8:45 AM Harman Mondragon Loraine, PT [facility]   2/27/2023  8:45 AM Doreene Duverney, PT [facility]   3/1/2023  8:45 AM Doreene Duverney, PT [facility]   3/24/2023 11:30 AM [facility] DEXA BI GE LUNAR DEXA [facility]   3/24/2023 12:00 PM SFE LIZA BI ROOM 4 [facility]FE   6/26/2023  8:45 AM TRIM LAB RESOURCE TRIM [facility] AMB   7/3/2023  9:00 AM Rigo Shukla,  [facility] AM[facility]

## 2023-02-01 ENCOUNTER — HOSPITAL ENCOUNTER (OUTPATIENT)
Dept: PHYSICAL THERAPY | Age: 73
Setting detail: RECURRING SERIES
Discharge: HOME | End: 2023-02-04
Payer: MEDICARE

## 2023-02-01 PROCEDURE — 97110 THERAPEUTIC EXERCISES: CPT

## 2023-02-01 PROCEDURE — 97140 MANUAL THERAPY 1/> REGIONS: CPT

## 2023-02-01 NOTE — PROGRESS NOTES
Jil Snyder  : 1950  Primary: Medicare Part A And B (Medicare)  Secondary: [address] @ [address]  [facility] [address]  Phone: [phone]  Fax: [phone] Plan Frequency: 2 times/week    Plan of Care/Certification Expiration Date: 23      PT Visit Info:  Plan Frequency: 2 times/week  Plan of Care/Certification Expiration Date: 23      Visit Count:  4    OUTPATIENT PHYSICAL THERAPY:OP NOTE TYPE: Treatment Note 2023       Episode  }Appt Desk             Treatment Diagnosis:  numbness L UE  Cervicalgia (M54.2)    Goals: (Goals have been discussed and agreed upon with patient.)  Short-Term Functional Goals: Time Frame: 4 weeks  Pt to report compliance with HEP  Pt to restore 70 degrees B cervical rotations for safe driving without turning body  Discharge Goals: Time Frame: 12 weeks  Pt to report restorative sleep patterns  Pt to report min to no radiating sxs  Pt to resume normal housekeeping and cooking tasks without issues    Medical/Referring Diagnosis:  Neck pain on left side [M54.2]  Referring Physician:  AMADEO Dobbins CNP, MD Orders:  PT Eval and Treat   Date of Onset:  No data recorded   Allergies:   Patient has no known allergies. Restrictions/Precautions:  No data recorded  No data recorded   Interventions Planned (Treatment may consist of any combination of the following):    Current Treatment Recommendations: Strengthening; ROM; Manual; Pain management; Home exercise program     Subjective Comments:  I had a pull down my neck last night. I am waking up with tingling. I get so tired of sleeping on my left side.   Initial: 1/10                         Post Session:  no increased pain with ex's  Medications Last Reviewed:  2023  Updated Objective Findings:      Observation, Palpation, and Special Tests   Diminished L C7 DTR  Tightness B UT, levator, UT, scalenes      ROM               23 Cervical rotation:    R                                                               0-65 before            0-70 before  Shoulder WNL      Strength   L thumb ext 4-/5, finger abd 4-/5  Others 5/5     Outcome Measure: Tool Used: Neck Disability Index (NDI)  Score:  Initial: 5/50  Most Recent: X/50 (Date: -- )   Interpretation of Score: The Neck Disability Index is a revised form of the Oswestry Low Back Pain Index and is designed to measure the activities of daily living in person's with neck pain. Each section is scored on a 0-5 scale, 5 representing the greatest disability. The scores of each section are added together for a total score of 50. Treatment   THERAPEUTIC ACTIVITY: ( see below for minutes): Therapeutic activities per grid below to improve mobility. Required moderate verbal and manual cues to improve functional mobility . THERAPEUTIC EXERCISE: (see below for minutes):  Exercises per grid below to improve strength. Required moderate verbal and manual cues to promote proper body alignment, promote proper body posture, promote proper body mechanics and promote proper body breathing techniques. Progressed resistance, range, repetitions and complexity of movement as indicated. MANUAL THERAPY: (see below for minutes): Joint mobilization and Soft tissue mobilization was utilized and necessary because of the patient's restricted joint motion, painful spasm, loss of articular motion and restricted motion of soft tissue. MODALITIES: (see below for minutes):      for pain modulation    AQUATIC THERAPY (see below for minutes): Aquatic treatment performed per flow grid for Decreased muscle strength, Decreased endurance, Decreased static/dynamic balance and reactive control, Decreased activity endurance, Decompression, Ease of movement and Low impact and reduced weight bearing activity.                                           Date: 1/19/23 1/23/23  Visit 2 1/25/23  Visit 3 2/1/23  Visit 4 Modalities:                Manual Therapy: 10 mins 15 mins 20 mins 30 mins    STM cervical supine Supine, seated Supine, seated seated    Cervical distraction  supine supine             Aquatics Activities:                Therapeutic Exercises: 35 mins 30 mins 40 mins 15 mins    Pt education, postural education, HEP, functional breathing, sleep hygiene 20 mins  Checked sleeping posture with pillows from home     UT stretch Passive,  active passive passive passive    Levator stretch Passive,  active passive passive passive    Scalene, SCM stretches B  passive passive passive    PROM cervical rotations supine passive passive seated    Chin tuck seated supine supine seated            HEP: see hand out       Treatment/Session Summary:    Treatment Assessment:  Increased R cervical rotation. Reviewed avoiding L cervical SB/rotation joseph when radicular sxs felt. Communication/Consultation:  None today  Equipment provided today:  None  Recommendations/Intent for next treatment session: Next visit will focus on stretching, manual techniques, strengthening as indicated.     Total Treatment Billable Duration:  45 minutes  Time In: 0845  Time Out: 0930    Artie Wallace, PT       Charge Capture  }Post Session Pain  PT Visit Info  [employer] Portal  MD Guidelines  Scanned Media  Benefits  MyChart    Future Appointments   Date Time Provider Bran Olea   2/2/2023  8:00 AM Dipesh Hurtado, PT [facility]   2/6/2023  8:45 AM Dipesh Hurtado, PT [facility]   2/8/2023  8:45 AM Dipesh Hurtado, PT [facility]   2/13/2023  8:45 AM Dipesh Hurtado, PT [facility]   2/15/2023  9:30 AM Dipesh Hurtado, PT [facility]   2/20/2023  8:45 AM Dipesh Hurtado, PT [facility]   2/22/2023  8:45 AM Dipesh Hurtado, PT [facility]   2/27/2023  8:45 AM Dipesh Hurtado, PT [facility]   3/1/2023  8:45 AM Dipesh Hurtado, PT [facility]   3/24/2023 11:30 AM SFE DEXA BI GE LUNAR DEXA [facility] SFE   3/24/2023 12:00 PM SFE LIZA BI ROOM 4 [facility] SFE   6/26/2023  8:45 AM TRIM LAB RESOURCE TRIM [facility] AMB   7/3/2023  9:00 AM Rigo Shukla,  TR[facility] AMB

## 2023-02-02 ENCOUNTER — HOSPITAL ENCOUNTER (OUTPATIENT)
Dept: PHYSICAL THERAPY | Age: 73
Setting detail: RECURRING SERIES
Discharge: HOME | End: 2023-02-05
Payer: MEDICARE

## 2023-02-02 PROCEDURE — 97110 THERAPEUTIC EXERCISES: CPT

## 2023-02-02 PROCEDURE — 97140 MANUAL THERAPY 1/> REGIONS: CPT

## 2023-02-02 NOTE — PROGRESS NOTES
Maggie Maynard  : 1950  Primary: Medicare Part A And B (Medicare)  Secondary: [address] @ [address]  [facility] [address]  Phone: [phone]  Fax: [phone] Plan Frequency: 2 times/week    Plan of Care/Certification Expiration Date: 23      PT Visit Info:  Plan Frequency: 2 times/week  Plan of Care/Certification Expiration Date: 23      Visit Count:  5    OUTPATIENT PHYSICAL THERAPY:OP NOTE TYPE: Treatment Note 2023       Episode  }Appt Desk             Treatment Diagnosis:  numbness L UE  Cervicalgia (M54.2)    Goals: (Goals have been discussed and agreed upon with patient.)  Short-Term Functional Goals: Time Frame: 4 weeks  Pt to report compliance with HEP  Pt to restore 70 degrees B cervical rotations for safe driving without turning body  Discharge Goals: Time Frame: 12 weeks  Pt to report restorative sleep patterns  Pt to report min to no radiating sxs  Pt to resume normal housekeeping and cooking tasks without issues    Medical/Referring Diagnosis:  Neck pain on left side [M54.2]  Referring Physician:  AMADEO Gutierrez CNP, MD Orders:  PT Eval and Treat   Date of Onset:  No data recorded   Allergies:   Patient has no known allergies. Restrictions/Precautions:  No data recorded  No data recorded   Interventions Planned (Treatment may consist of any combination of the following):    Current Treatment Recommendations: Strengthening; ROM; Manual; Pain management; Home exercise program     Subjective Comments: It is doing better.     Initial: 1/10                         Post Session:  no increased pain with ex's  Medications Last Reviewed:  2023  Updated Objective Findings:      Observation, Palpation, and Special Tests   Diminished L C7 DTR  Tightness B UT, levator, UT, scalenes      ROM               23   Cervical rotation:    R                                                               0-70 before            0-70 before  Shoulder WNL      Strength   5/5 MMT L UE     Outcome Measure: Tool Used: Neck Disability Index (NDI)  Score:  Initial: 5/50  Most Recent: X/50 (Date: -- )   Interpretation of Score: The Neck Disability Index is a revised form of the Oswestry Low Back Pain Index and is designed to measure the activities of daily living in person's with neck pain. Each section is scored on a 0-5 scale, 5 representing the greatest disability. The scores of each section are added together for a total score of 50. Treatment   THERAPEUTIC ACTIVITY: ( see below for minutes): Therapeutic activities per grid below to improve mobility. Required moderate verbal and manual cues to improve functional mobility . THERAPEUTIC EXERCISE: (see below for minutes):  Exercises per grid below to improve strength. Required moderate verbal and manual cues to promote proper body alignment, promote proper body posture, promote proper body mechanics and promote proper body breathing techniques. Progressed resistance, range, repetitions and complexity of movement as indicated. MANUAL THERAPY: (see below for minutes): Joint mobilization and Soft tissue mobilization was utilized and necessary because of the patient's restricted joint motion, painful spasm, loss of articular motion and restricted motion of soft tissue. MODALITIES: (see below for minutes):      for pain modulation    AQUATIC THERAPY (see below for minutes): Aquatic treatment performed per flow grid for Decreased muscle strength, Decreased endurance, Decreased static/dynamic balance and reactive control, Decreased activity endurance, Decompression, Ease of movement and Low impact and reduced weight bearing activity.                                           Date: 1/19/23 1/23/23  Visit 2 1/25/23  Visit 3 2/1/23  Visit 4 2/2/23  Visit 5    Modalities:                  Manual Therapy: 10 mins 15 mins 20 mins 30 mins 30 mins    STM cervical supine Supine, seated Supine, seated seated seated    Cervical distraction  supine supine               Aquatics Activities:                  Therapeutic Exercises: 35 mins 30 mins 40 mins 15 mins 15 mins    Pt education, postural education, HEP, functional breathing, sleep hygiene 20 mins  Checked sleeping posture with pillows from home      UT stretch Passive,  active passive passive passive passive    Levator stretch Passive,  active passive passive passive passive    Scalene, SCM stretches B  passive passive passive passive    PROM cervical rotations supine passive passive seated seated    Chin tuck seated supine supine seated seated             HEP: see hand out       Treatment/Session Summary:    Treatment Assessment:   Good response with normalized L UE strength. Communication/Consultation:  None today  Equipment provided today:  None  Recommendations/Intent for next treatment session: Next visit will focus on stretching, manual techniques, strengthening as indicated.     Total Treatment Billable Duration:  45 minutes  Time In: 0800  Time Out: 0845    Tray York PT       Charge Capture  }Post Session Pain  PT Visit Info  [employer] Portal  MD Guidelines  Scanned Media  Benefits  MyChart    Future Appointments   Date Time Provider Bran Olea   2/6/2023  8:45 AM [facility], PT [facility]   2/8/2023  8:45 [facility]   2/13/2023  8:45 AM [facility], PT [facility]   2/15/2023  9:30 AM [place] [facility]   2/20/2023  8:45 AM [place] [facility]   2/22/2023  8:45 AM [facility], PT [facility]   2/27/2023  8:45 AM [place] [facility]   3/1/2023  8:45 AM [place] [facility]   3/24/2023 11:30 AM SFE DEXA BI GE LUNAR DEXA [facility]   3/24/2023 12:00 PM SFE LIZA BI ROOM 4 [facility] SFE   6/26/2023  8:45 AM TRIM LAB RESOURCE TRIM [facility] AMB   7/3/2023  9:00 AM Rigo Shukla,  TRIM [facility] AMB

## 2023-02-06 ENCOUNTER — HOSPITAL ENCOUNTER (OUTPATIENT)
Dept: PHYSICAL THERAPY | Age: 73
Setting detail: RECURRING SERIES
Discharge: HOME | End: 2023-02-09
Payer: MEDICARE

## 2023-02-06 PROCEDURE — 97110 THERAPEUTIC EXERCISES: CPT

## 2023-02-06 PROCEDURE — 97140 MANUAL THERAPY 1/> REGIONS: CPT

## 2023-02-06 NOTE — PROGRESS NOTES
Doris Woodard  : 1950  Primary: Medicare Part A And B (Medicare)  Secondary: [address] @ [address]  [facility] [address]  Phone: [phone]  Fax: [phone] Plan Frequency: 2 times/week    Plan of Care/Certification Expiration Date: 23      PT Visit Info:  Plan Frequency: 2 times/week  Plan of Care/Certification Expiration Date: 23      Visit Count:  6    OUTPATIENT PHYSICAL THERAPY:OP NOTE TYPE: Treatment Note 2023       Episode  }Appt Desk             Treatment Diagnosis:  numbness L UE  Cervicalgia (M54.2)    Goals: (Goals have been discussed and agreed upon with patient.)  Short-Term Functional Goals: Time Frame: 4 weeks  Pt to report compliance with HEP  Pt to restore 70 degrees B cervical rotations for safe driving without turning body  Discharge Goals: Time Frame: 12 weeks  Pt to report restorative sleep patterns  Pt to report min to no radiating sxs  Pt to resume normal housekeeping and cooking tasks without issues    Medical/Referring Diagnosis:  Neck pain on left side [M54.2]  Referring Physician:  AMADEO Keen CNP, MD Orders:  PT Eval and Treat   Date of Onset:  No data recorded   Allergies:   Patient has no known allergies. Restrictions/Precautions:  No data recorded  No data recorded   Interventions Planned (Treatment may consist of any combination of the following):    Current Treatment Recommendations: Strengthening; ROM; Manual; Pain management; Home exercise program     Subjective Comments:   I think I know how to get out of it when it gets worse but it is always there to some degree.     Initial: 1/10                         Post Session:  no increased pain with ex's  Medications Last Reviewed:  2023  Updated Objective Findings:      Observation, Palpation, and Special Tests   Diminished L C7 DTR  Tightness B UT, levator, UT, scalenes      ROM               23   Cervical rotation: R                                                               0-70 before            0-70 before  Shoulder WNL      Strength   5/5 MMT L UE     Outcome Measure: Tool Used: Neck Disability Index (NDI)  Score:  Initial: 5/50  Most Recent: X/50 (Date: -- )   Interpretation of Score: The Neck Disability Index is a revised form of the Oswestry Low Back Pain Index and is designed to measure the activities of daily living in person's with neck pain. Each section is scored on a 0-5 scale, 5 representing the greatest disability. The scores of each section are added together for a total score of 50. Treatment   THERAPEUTIC ACTIVITY: ( see below for minutes): Therapeutic activities per grid below to improve mobility. Required moderate verbal and manual cues to improve functional mobility . THERAPEUTIC EXERCISE: (see below for minutes):  Exercises per grid below to improve strength. Required moderate verbal and manual cues to promote proper body alignment, promote proper body posture, promote proper body mechanics and promote proper body breathing techniques. Progressed resistance, range, repetitions and complexity of movement as indicated. MANUAL THERAPY: (see below for minutes): Joint mobilization and Soft tissue mobilization was utilized and necessary because of the patient's restricted joint motion, painful spasm, loss of articular motion and restricted motion of soft tissue. MODALITIES: (see below for minutes):      for pain modulation    AQUATIC THERAPY (see below for minutes): Aquatic treatment performed per flow grid for Decreased muscle strength, Decreased endurance, Decreased static/dynamic balance and reactive control, Decreased activity endurance, Decompression, Ease of movement and Low impact and reduced weight bearing activity.                                           Date: 1/19/23 1/23/23  Visit 2 1/25/23  Visit 3 2/1/23  Visit 4 2/2/23  Visit 5 2/6/23  Visit 6   Modalities: Manual Therapy: 10 mins 15 mins 20 mins 30 mins 30 mins 30 mins   STM cervical supine Supine, seated Supine, seated seated seated Seated, supine   Cervical distraction  supine supine   supine            Aquatics Activities:                  Therapeutic Exercises: 35 mins 30 mins 40 mins 15 mins 15 mins 15 mins   Pt education, postural education, HEP, functional breathing, sleep hygiene 20 mins  Checked sleeping posture with pillows from home      UT stretch Passive,  active passive passive passive passive passive   Levator stretch Passive,  active passive passive passive passive passive   Scalene, SCM stretches B  passive passive passive passive passive   PROM cervical rotations supine passive passive seated seated Seated, supine   Chin tuck seated supine supine seated seated Seated, supine            HEP: see hand out       Treatment/Session Summary:    Treatment Assessment:   Tight L levator. Reminded of home levator stretches. Cues needed not to splint cervical posture. Communication/Consultation:  None today  Equipment provided today:  None  Recommendations/Intent for next treatment session: Next visit will focus on stretching, manual techniques, strengthening as indicated.     Total Treatment Billable Duration:  45 minutes  Time In: 0845  Time Out: 0930    Alex Sherman, PT       Charge Capture  }Post Session Pain  PT Visit Info  [employer] Portal  MD Guidelines  Scanned Media  Benefits  MyChart    Future Appointments   Date Time Provider Bran Olea   2/8/2023  8:45 AM Mehreen Slate, PT [facility]   2/13/2023  8:45 AM Mehreen Slate, PT [facility]   2/15/2023  9:30 AM Mehreen Slate, PT [facility]   2/20/2023  8:45 AM Mehreen Slate, PT [facility]   2/22/2023  8:45 AM Mehreen Slate, PT [facility]   2/27/2023  8:45 AM Mehreen Slate, PT [facility]   3/1/2023  8:45 AM Mehreen Slate, PT [facility]   3/24/2023 11:30 AM SFE DEXA BI GE LUNAR DEXA [facility]   3/24/2023 12:00 PM SFE LIZA BI ROOM 4 [facility] 6/26/2023  8:45 AM TRIM LAB RESOURCE TRIM [facility]   7/3/2023  9:00 AM DO JAMIE Pickett [facility] AM[facility]

## 2023-02-08 ENCOUNTER — HOSPITAL ENCOUNTER (OUTPATIENT)
Dept: PHYSICAL THERAPY | Age: 73
Setting detail: RECURRING SERIES
Discharge: HOME | End: 2023-02-11
Payer: MEDICARE

## 2023-02-08 PROCEDURE — 97140 MANUAL THERAPY 1/> REGIONS: CPT

## 2023-02-08 PROCEDURE — 97110 THERAPEUTIC EXERCISES: CPT

## 2023-02-08 NOTE — PROGRESS NOTES
Natty Sweet  : 1950  Primary: Medicare Part A And B (Medicare)  Secondary: [address] @ [address]  [facility] [address]  Phone: [phone]  Fax: [phone] Plan Frequency: 2 times/week    Plan of Care/Certification Expiration Date: 23      PT Visit Info:  Plan Frequency: 2 times/week  Plan of Care/Certification Expiration Date: 23      Visit Count:  7    OUTPATIENT PHYSICAL THERAPY:OP NOTE TYPE: Treatment Note 2023       Episode  }Appt Desk             Treatment Diagnosis:  numbness L UE  Cervicalgia (M54.2)    Goals: (Goals have been discussed and agreed upon with patient.)  Short-Term Functional Goals: Time Frame: 4 weeks  Pt to report compliance with HEP  Pt to restore 70 degrees B cervical rotations for safe driving without turning body  Discharge Goals: Time Frame: 12 weeks  Pt to report restorative sleep patterns  Pt to report min to no radiating sxs  Pt to resume normal housekeeping and cooking tasks without issues    Medical/Referring Diagnosis:  Neck pain on left side [M54.2]  Referring Physician:  AMADEO Manuel CNP, MD Orders:  PT Eval and Treat   Date of Onset:  No data recorded   Allergies:   Patient has no known allergies. Restrictions/Precautions:  No data recorded  No data recorded   Interventions Planned (Treatment may consist of any combination of the following):    Current Treatment Recommendations: Strengthening; ROM; Manual; Pain management; Home exercise program     Subjective Comments:   I am not waking up because of the arm now. I still feel it sometimes but not as much.   Initial: 0/10                         Post Session:  no increased pain with ex's  Medications Last Reviewed:  2023  Updated Objective Findings:      Observation, Palpation, and Special Tests   Diminished L C7 DTR  Tightness B UT, levator, UT, scalenes      ROM               23   Cervical rotation:    R 0-70 before            0-70 before  Shoulder WNL        Outcome Measure: Tool Used: Neck Disability Index (NDI)  Score:  Initial: 5/50  Most Recent: X/50 (Date: -- )   Interpretation of Score: The Neck Disability Index is a revised form of the Oswestry Low Back Pain Index and is designed to measure the activities of daily living in person's with neck pain. Each section is scored on a 0-5 scale, 5 representing the greatest disability. The scores of each section are added together for a total score of 50. Treatment   THERAPEUTIC ACTIVITY: ( see below for minutes): Therapeutic activities per grid below to improve mobility. Required moderate verbal and manual cues to improve functional mobility . THERAPEUTIC EXERCISE: (see below for minutes):  Exercises per grid below to improve strength. Required moderate verbal and manual cues to promote proper body alignment, promote proper body posture, promote proper body mechanics and promote proper body breathing techniques. Progressed resistance, range, repetitions and complexity of movement as indicated. MANUAL THERAPY: (see below for minutes): Joint mobilization and Soft tissue mobilization was utilized and necessary because of the patient's restricted joint motion, painful spasm, loss of articular motion and restricted motion of soft tissue. MODALITIES: (see below for minutes):      for pain modulation    AQUATIC THERAPY (see below for minutes): Aquatic treatment performed per flow grid for Decreased muscle strength, Decreased endurance, Decreased static/dynamic balance and reactive control, Decreased activity endurance, Decompression, Ease of movement and Low impact and reduced weight bearing activity.                                           Date: 2/2/23  Visit 5 2/6/23  Visit 6 2/8/23  Visit 7    Modalities:              Manual Therapy: 30 mins 30 mins 30 mins    STM cervical seated Seated, supine Seated Cervical distraction  supine            Aquatics Activities:              Therapeutic Exercises: 15 mins 15 mins 15 mins    Pt education, postural education, HEP, functional breathing, sleep hygiene       UT stretch passive passive passive    Levator stretch passive passive passive    Scalene, SCM stretches B passive passive passive    PROM cervical rotations seated Seated, supine seated    Chin tuck seated Seated, supine seated           HEP: see hand out       Treatment/Session Summary:    Treatment Assessment:   Mild L levator tightness. ROM remains full B. Communication/Consultation:  None today  Equipment provided today:  None  Recommendations/Intent for next treatment session: Next visit will focus on stretching, manual techniques, strengthening as indicated.     Total Treatment Billable Duration:  45 minutes  Time In: 0845  Time Out: 0930    Sommer Camacho, PT       Charge Capture  }Post Session Pain  PT Visit Info  [employer] Portal  MD Guidelines  Scanned Media  Benefits  MyChart    Future Appointments   Date Time Provider Bran Olea   2/13/2023  1:30 PM Miri Avina, PT [facility]   2/15/2023  9:30 AM Miri Avina, PT [facility]   2/20/2023  8:45 AM Miri Avina, PT [facility]   2/22/2023  8:45 AM Miri Avina, PT [facility]   2/27/2023  8:45 AM Miri Avina, PT [facility]   3/1/2023  8:45 AM Miri Avina, PT [facility]   3/24/2023 11:30 AM SFE DEXA BI GE LUNAR DEXA [facility]   3/24/2023 12:00 PM SFE LIZA BI ROOM 4 [facility]   6/26/2023  8:45 AM TRIM LAB RESOURCE TRIM [facility]   7/3/2023  9:00 AM Rigo Shukla,  TRIM [facility] AMB

## 2023-02-13 ENCOUNTER — HOSPITAL ENCOUNTER (OUTPATIENT)
Dept: PHYSICAL THERAPY | Age: 73
Setting detail: RECURRING SERIES
Discharge: HOME | End: 2023-02-16
Payer: MEDICARE

## 2023-02-13 PROCEDURE — 97140 MANUAL THERAPY 1/> REGIONS: CPT

## 2023-02-13 PROCEDURE — 97110 THERAPEUTIC EXERCISES: CPT

## 2023-02-13 NOTE — PROGRESS NOTES
Yuni Sanchez  : 1950  Primary: Medicare Part A And B (Medicare)  Secondary: [address] @ [address]  [facility] [address]  Phone: [phone]  Fax: [phone] Plan Frequency: 2 times/week    Plan of Care/Certification Expiration Date: 23      PT Visit Info:  Plan Frequency: 2 times/week  Plan of Care/Certification Expiration Date: 23      Visit Count:  8    OUTPATIENT PHYSICAL THERAPY:OP NOTE TYPE: Treatment Note and Progress Note 2023       Episode  }Appt Desk             Treatment Diagnosis:  numbness L UE  Cervicalgia (M54.2)    Goals: (Goals have been discussed and agreed upon with patient.)  Short-Term Functional Goals: Time Frame: 4 weeks  Pt to report compliance with HEP - MET  Pt to restore 70 degrees B cervical rotations for safe driving without turning body - MET  Discharge Goals: Time Frame: 12 weeks  Pt to report restorative sleep patterns - ongoing  Pt to report min to no radiating sxs - ongoing  Pt to resume normal housekeeping and cooking tasks without issues - ongoing    Medical/Referring Diagnosis:  Neck pain on left side [M54.2]  Referring Physician:  AMADEO Rivera CNP, MD Orders:  PT Eval and Treat   Date of Onset:  No data recorded   Allergies:   Patient has no known allergies. Restrictions/Precautions:  No data recorded  No data recorded   Interventions Planned (Treatment may consist of any combination of the following):    Current Treatment Recommendations: Strengthening; ROM; Manual; Pain management; Home exercise program     Subjective Comments:   I don't get it as often but it is predictable when it is going to happen. Sleeping is even better. I always have some sensation in the 4th and 5th fingers. Watching my posture helps and will get rid of the tingling.     Initial: 0/10                         Post Session:  no increased pain with ex's  Medications Last Reviewed: 2/13/2023  Updated Objective Findings:  2/13/23    Observation, Palpation, and Special Tests   Diminished L C7 DTR  Tightness B UT, levator, UT, scalenes      Outcome Measure: Tool Used: Neck Disability Index (NDI)  Score:  Initial: 5/50  Most Recent: 2/50 (Date: 2/13/23)   Interpretation of Score: The Neck Disability Index is a revised form of the Oswestry Low Back Pain Index and is designed to measure the activities of daily living in person's with neck pain. Each section is scored on a 0-5 scale, 5 representing the greatest disability. The scores of each section are added together for a total score of 50. Treatment   THERAPEUTIC ACTIVITY: ( see below for minutes): Therapeutic activities per grid below to improve mobility. Required moderate verbal and manual cues to improve functional mobility . THERAPEUTIC EXERCISE: (see below for minutes):  Exercises per grid below to improve strength. Required moderate verbal and manual cues to promote proper body alignment, promote proper body posture, promote proper body mechanics and promote proper body breathing techniques. Progressed resistance, range, repetitions and complexity of movement as indicated. MANUAL THERAPY: (see below for minutes): Joint mobilization and Soft tissue mobilization was utilized and necessary because of the patient's restricted joint motion, painful spasm, loss of articular motion and restricted motion of soft tissue. MODALITIES: (see below for minutes):      for pain modulation    AQUATIC THERAPY (see below for minutes): Aquatic treatment performed per flow grid for Decreased muscle strength, Decreased endurance, Decreased static/dynamic balance and reactive control, Decreased activity endurance, Decompression, Ease of movement and Low impact and reduced weight bearing activity.                                           Date: 2/2/23  Visit 5 2/6/23  Visit 6 2/8/23  Visit 7 2/13/23  Visit 8  PN   Manual Therapy: 30 mins 30 mins 30 mins 30 mins   STM cervical seated Seated, supine Seated seated   Cervical distraction  supine  supine          Therapeutic Exercises: 15 mins 15 mins 15 mins 25 mins   Pt education, postural education, HEP, functional breathing, sleep hygiene       UT stretch passive passive passive passive   Levator stretch passive passive passive passive   Scalene, SCM stretches B passive passive passive passive   PROM cervical rotations seated Seated, supine seated seated   Chin tuck seated Seated, supine seated seated          HEP: see hand out       Treatment/Session Summary:    Treatment Assessment:   Good response with fewer sxs, 5/5 strength, ability to control tingling with cervical postures. Tricep DTR remains diminished. Communication/Consultation:  None today  Equipment provided today:  None  Recommendations/Intent for next treatment session: Next visit will focus on stretching, manual techniques, strengthening as indicated.     Total Treatment Billable Duration:  55 minutes  Time In: 0130  Time Out: 0230    Chasity Gray, PT       Charge Capture  }Post Session Pain  PT Visit Info  [employer] Portal  MD Guidelines  Scanned Media  Benefits  MyChart    Future Appointments   Date Time Provider Bran Olea   2/15/2023  9:30 AM Selvin Evelina, PT [facility]   2/20/2023  8:45 AM Selvin Evelina, PT [facility]   2/22/2023  8:45 AM [facility]   2/27/2023  8:45 AM Selvin Evelina, PT [facility]   3/1/2023  8:45 AM Selvin Evelina, PT [facility]   3/24/2023 11:30 AM SFE DEXA BI GE LUNAR DEXA [facility]   3/24/2023 12:00 PM SFE LIZA BI ROOM 4 [facility]   6/26/2023  8:45 AM TRIM LAB RESOURCE TRIM [facility] AMB   7/3/2023  9:00 AM Rigo Shukla,  TRIM [facility] AMB

## 2023-02-15 ENCOUNTER — HOSPITAL ENCOUNTER (OUTPATIENT)
Dept: PHYSICAL THERAPY | Age: 73
Setting detail: RECURRING SERIES
Discharge: HOME | End: 2023-02-18
Payer: MEDICARE

## 2023-02-15 PROCEDURE — 97140 MANUAL THERAPY 1/> REGIONS: CPT

## 2023-02-15 PROCEDURE — 97110 THERAPEUTIC EXERCISES: CPT

## 2023-02-15 NOTE — PROGRESS NOTES
Donita Lutz  : 1950  Primary: Medicare Part A And B (Medicare)  Secondary: [address] @ [address]  [address] [facility] [address]  Phone: [phone]  Fax: [phone] Plan Frequency: 2 times/week    Plan of Care/Certification Expiration Date: 23      PT Visit Info:  Plan Frequency: 2 times/week  Plan of Care/Certification Expiration Date: 23      Visit Count:  9    OUTPATIENT PHYSICAL THERAPY:OP NOTE TYPE: Treatment Note 2/15/2023       Episode  }Appt Desk             Treatment Diagnosis:  numbness L UE  Cervicalgia (M54.2)    Goals: (Goals have been discussed and agreed upon with patient.)  Short-Term Functional Goals: Time Frame: 4 weeks  Pt to report compliance with HEP - MET  Pt to restore 70 degrees B cervical rotations for safe driving without turning body - MET  Discharge Goals: Time Frame: 12 weeks  Pt to report restorative sleep patterns - ongoing  Pt to report min to no radiating sxs - ongoing  Pt to resume normal housekeeping and cooking tasks without issues - ongoing    Medical/Referring Diagnosis:  Neck pain on left side [M54.2]  Referring Physician:  AMADEO Padgett CNP, MD Orders:  PT Eval and Treat   Date of Onset:  No data recorded   Allergies:   Patient has no known allergies. Restrictions/Precautions:  No data recorded  No data recorded   Interventions Planned (Treatment may consist of any combination of the following):    Current Treatment Recommendations: Strengthening; ROM; Manual; Pain management; Home exercise program     Subjective Comments:  I am still doing pretty well. Initial: 0/10                         Post Session:  no increased pain with ex's  Medications Last Reviewed:  2/15/2023  Updated Objective Findings:  23    Observation, Palpation, and Special Tests   Diminished L C7 DTR  Tightness B UT, levator, UT, scalenes      Outcome Measure:    Tool Used: Neck Disability Index (NDI)  Score:  Initial: 5/50  Most Recent: 2/50 (Date: 2/13/23)   Interpretation of Score: The Neck Disability Index is a revised form of the Oswestry Low Back Pain Index and is designed to measure the activities of daily living in person's with neck pain. Each section is scored on a 0-5 scale, 5 representing the greatest disability. The scores of each section are added together for a total score of 50. Treatment   THERAPEUTIC ACTIVITY: ( see below for minutes): Therapeutic activities per grid below to improve mobility. Required moderate verbal and manual cues to improve functional mobility . THERAPEUTIC EXERCISE: (see below for minutes):  Exercises per grid below to improve strength. Required moderate verbal and manual cues to promote proper body alignment, promote proper body posture, promote proper body mechanics and promote proper body breathing techniques. Progressed resistance, range, repetitions and complexity of movement as indicated. MANUAL THERAPY: (see below for minutes): Joint mobilization and Soft tissue mobilization was utilized and necessary because of the patient's restricted joint motion, painful spasm, loss of articular motion and restricted motion of soft tissue. MODALITIES: (see below for minutes):      for pain modulation    AQUATIC THERAPY (see below for minutes): Aquatic treatment performed per flow grid for Decreased muscle strength, Decreased endurance, Decreased static/dynamic balance and reactive control, Decreased activity endurance, Decompression, Ease of movement and Low impact and reduced weight bearing activity.                                           Date: 2/2/23  Visit 5 2/6/23  Visit 6 2/8/23  Visit 7 2/13/23  Visit 8  PN 2/15/23  Visit 9    Manual Therapy: 30 mins 30 mins 30 mins 30 mins 30 mins    STM cervical seated Seated, supine Seated seated seated    Cervical distraction  supine  supine supine             Therapeutic Exercises: 15 mins 15 mins 15 mins 25 mins 25 mins    Pt education, postural education, HEP, functional breathing, sleep hygiene         UT stretch passive passive passive passive passive    Levator stretch passive passive passive passive passive    Scalene, SCM stretches B passive passive passive passive passive    PROM cervical rotations seated Seated, supine seated seated supine    Chin tuck seated Seated, supine seated seated supine             HEP: see hand out       Treatment/Session Summary:    Treatment Assessment:  Positive response continues. Will reassess at next visit regarding decreasing to once/week for remaining visits. Communication/Consultation:  None today  Equipment provided today:  None  Recommendations/Intent for next treatment session: Next visit will focus on stretching, manual techniques, strengthening as indicated.     Total Treatment Billable Duration:  55 minutes  Time In: 0930  Time Out: 2163    XAYAD [id number], PT       Charge Capture  }Post Session Pain  PT Visit Info  [employer] Portal  MD Guidelines  Scanned Media  Benefits  MyChart    Future Appointments   Date Time Provider Bran Olea   2/20/2023  8:45 AM Malcom Amin, PT [facility]   2/22/2023  8:45 AM Malcom Amin, PT [facility]   2/27/2023  8:45 [facility]   3/1/2023  8:45 AM Malcom Amin, PT [facility]   3/24/2023 11:30 AM SFE DEXA BI GE LUNAR DEXA [facility]   3/24/2023 12:00 PM SFE LIZA BI ROOM 4 [facility]   6/26/2023  8:45 AM TRIM LAB RESOURCE TRIM [facility] AM[facility]   7/3/2023  9:00 AM Rigo Shukla,  TRIM [facility] AMB

## 2023-02-18 ENCOUNTER — PATIENT MESSAGE (OUTPATIENT)
Dept: INTERNAL MEDICINE CLINIC | Facility: CLINIC | Age: 73
End: 2023-02-18

## 2023-02-20 ENCOUNTER — HOSPITAL ENCOUNTER (OUTPATIENT)
Dept: PHYSICAL THERAPY | Age: 73
Setting detail: RECURRING SERIES
Discharge: HOME | End: 2023-02-23
Payer: MEDICARE

## 2023-02-20 PROCEDURE — 97110 THERAPEUTIC EXERCISES: CPT

## 2023-02-20 PROCEDURE — 97140 MANUAL THERAPY 1/> REGIONS: CPT

## 2023-02-20 RX ORDER — TRAZODONE HYDROCHLORIDE 50 MG/1
TABLET ORAL
Qty: 90 TABLET | Refills: 1 | Status: SHIPPED | OUTPATIENT
Start: 2023-02-20

## 2023-02-20 NOTE — PROGRESS NOTES
Kelly Box  : 1950  Primary: Medicare Part A And B (Medicare)  Secondary: [address] @ [place]  [address] [facility] [address]  Phone: [phone]  Fax: [phone] Plan Frequency: 2 times/week    Plan of Care/Certification Expiration Date: 23      PT Visit Info:  Plan Frequency: 2 times/week  Plan of Care/Certification Expiration Date: 23      Visit Count:  10    OUTPATIENT PHYSICAL THERAPY:OP NOTE TYPE: Treatment Note 2023       Episode  }Appt Desk             Treatment Diagnosis:  numbness L UE  Cervicalgia (M54.2)    Goals: (Goals have been discussed and agreed upon with patient.)  Short-Term Functional Goals: Time Frame: 4 weeks  Pt to report compliance with HEP - MET  Pt to restore 70 degrees B cervical rotations for safe driving without turning body - MET  Discharge Goals: Time Frame: 12 weeks  Pt to report restorative sleep patterns - ongoing  Pt to report min to no radiating sxs - ongoing  Pt to resume normal housekeeping and cooking tasks without issues - ongoing    Medical/Referring Diagnosis:  Neck pain on left side [M54.2]  Referring Physician:  AMADEO Alanis CNP, MD Orders:  PT Eval and Treat   Date of Onset:  No data recorded   Allergies:   Patient has no known allergies. Restrictions/Precautions:  No data recorded  No data recorded   Interventions Planned (Treatment may consist of any combination of the following):    Current Treatment Recommendations: Strengthening; ROM; Manual; Pain management; Home exercise program     Subjective Comments:  I feel like I manage it a lot better. I realized at the kitchen counter I lean forward so when I straighten up it goes away.   Initial: 0/10                         Post Session:  no increased pain with ex's  Medications Last Reviewed:  2023  Updated Objective Findings:  23    Observation, Palpation, and Special Tests   Diminished L C7 DTR  Tightness B UT, levator, UT, scalenes      Outcome Measure: Tool Used: Neck Disability Index (NDI)  Score:  Initial: 5/50  Most Recent: 2/50 (Date: 2/13/23)   Interpretation of Score: The Neck Disability Index is a revised form of the Oswestry Low Back Pain Index and is designed to measure the activities of daily living in person's with neck pain. Each section is scored on a 0-5 scale, 5 representing the greatest disability. The scores of each section are added together for a total score of 50. Treatment   THERAPEUTIC ACTIVITY: ( see below for minutes): Therapeutic activities per grid below to improve mobility. Required moderate verbal and manual cues to improve functional mobility . THERAPEUTIC EXERCISE: (see below for minutes):  Exercises per grid below to improve strength. Required moderate verbal and manual cues to promote proper body alignment, promote proper body posture, promote proper body mechanics and promote proper body breathing techniques. Progressed resistance, range, repetitions and complexity of movement as indicated. MANUAL THERAPY: (see below for minutes): Joint mobilization and Soft tissue mobilization was utilized and necessary because of the patient's restricted joint motion, painful spasm, loss of articular motion and restricted motion of soft tissue. MODALITIES: (see below for minutes):      for pain modulation    AQUATIC THERAPY (see below for minutes): Aquatic treatment performed per flow grid for Decreased muscle strength, Decreased endurance, Decreased static/dynamic balance and reactive control, Decreased activity endurance, Decompression, Ease of movement and Low impact and reduced weight bearing activity.                                           Date: 2/13/23  Visit 8  PN 2/15/23  Visit 9 2/20/23  Visit 10    Manual Therapy: 30 mins 30 mins 30 mins    STM cervical seated seated seated    Cervical distraction supine supine            Therapeutic Exercises: 25 mins 25 mins 25 mins Pt education, postural education, HEP, functional breathing, sleep hygiene       UT stretch passive passive passive    Levator stretch passive passive passive    Scalene, SCM stretches B passive passive passive    PROM cervical rotations seated supine seated    Chin tuck seated supine seated           HEP: see hand out       Treatment/Session Summary:    Treatment Assessment:  No arm sxs during therapy today. Good management at home. Will decrease to once/week and monitor for change in sxs. Communication/Consultation:  None today  Equipment provided today:  None  Recommendations/Intent for next treatment session: Next visit will focus on stretching, manual techniques, strengthening as indicated.     Total Treatment Billable Duration:  55 minutes  Time In: 4939  Time Out: 0945    Arabella Kaufman, PT       Charge Capture  }Post Session Pain  PT Visit Info  [employer] Portal  MD Guidelines  Scanned Media  Benefits  MyChart    Future Appointments   Date Time Provider Bran Ole[place]   2/22/2023  8:45 AM Edin Palomo, PT [facility]   2/27/2023  8:45 AM Edin Palomo, PT [facility]   3/1/2023  8:45 AM Edin Palomo, PT [facility]   3/24/2023 11:30 AM SFE DEXA BI GE LUNAR DEXA SFERMAM E   3/24/2023 12:00 PM SFE LIZA BI ROOM 4 SFERMAM SFE   6/26/2023  8:45 AM TRIM LAB RESOURCE TRIM GVL AMB   7/3/2023  9:00 AM Rigo Shukla,  TRIM GVL AMB

## 2023-02-22 ENCOUNTER — APPOINTMENT (OUTPATIENT)
Dept: PHYSICAL THERAPY | Age: 73
End: 2023-02-22
Payer: MEDICARE

## 2023-02-27 ENCOUNTER — HOSPITAL ENCOUNTER (OUTPATIENT)
Dept: PHYSICAL THERAPY | Age: 73
Setting detail: RECURRING SERIES
Discharge: HOME | End: 2023-03-02
Payer: MEDICARE

## 2023-02-27 PROCEDURE — 97140 MANUAL THERAPY 1/> REGIONS: CPT

## 2023-02-27 PROCEDURE — 97110 THERAPEUTIC EXERCISES: CPT

## 2023-02-27 NOTE — DISCHARGE SUMMARY
Austin Pardo  : 1950  Primary: Medicare Part A And B (Medicare)  Secondary: [address] @ [address]  [address] [facility] [address]  Phone: [phone]  Fax: [phone] Plan Frequency: 2 times/week    Plan of Care/Certification Expiration Date: 23      PT Visit Info:  Plan Frequency: 2 times/week  Plan of Care/Certification Expiration Date: 23      Visit Count:  11    OUTPATIENT PHYSICAL THERAPY:OP NOTE TYPE: Treatment Note and Discharge Summary 2023       Episode  }Appt Desk             Treatment Diagnosis:  numbness L UE  Cervicalgia (M54.2)    Goals: (Goals have been discussed and agreed upon with patient.)  Short-Term Functional Goals: Time Frame: 4 weeks  Pt to report compliance with HEP - MET  Pt to restore 70 degrees B cervical rotations for safe driving without turning body - MET  Discharge Goals: Time Frame: 12 weeks  Pt to report restorative sleep patterns - ongoing  Pt to report min to no radiating sxs - ongoing  Pt to resume normal housekeeping and cooking tasks without issues - ongoing    Medical/Referring Diagnosis:  Neck pain on left side [M54.2]  Referring Physician:  AMADEO Chawla CNP, MD Orders:  PT Eval and Treat   Date of Onset:  No data recorded   Allergies:   Patient has no known allergies. Restrictions/Precautions:  No data recorded  No data recorded   Interventions Planned (Treatment may consist of any combination of the following):    Current Treatment Recommendations: Strengthening; ROM; Manual; Pain management; Home exercise program     Subjective Comments:  I am doing well actually. I am really happy with how I've done. It still tingles but I can make it go away.   Initial: 0/10                         Post Session:  no increased pain with ex's  Medications Last Reviewed:  2023  Updated Objective Findings:  23    Observation, Palpation, and Special Tests   Diminished L C7 DTR Outcome Measure: Tool Used: Neck Disability Index (NDI)  Score:  Initial: 5/50  Most Recent: 2/50 (Date: 2/13/23)   Interpretation of Score: The Neck Disability Index is a revised form of the Oswestry Low Back Pain Index and is designed to measure the activities of daily living in person's with neck pain. Each section is scored on a 0-5 scale, 5 representing the greatest disability. The scores of each section are added together for a total score of 50. Treatment   THERAPEUTIC ACTIVITY: ( see below for minutes): Therapeutic activities per grid below to improve mobility. Required moderate verbal and manual cues to improve functional mobility . THERAPEUTIC EXERCISE: (see below for minutes):  Exercises per grid below to improve strength. Required moderate verbal and manual cues to promote proper body alignment, promote proper body posture, promote proper body mechanics and promote proper body breathing techniques. Progressed resistance, range, repetitions and complexity of movement as indicated. MANUAL THERAPY: (see below for minutes): Joint mobilization and Soft tissue mobilization was utilized and necessary because of the patient's restricted joint motion, painful spasm, loss of articular motion and restricted motion of soft tissue. MODALITIES: (see below for minutes):      for pain modulation    AQUATIC THERAPY (see below for minutes): Aquatic treatment performed per flow grid for Decreased muscle strength, Decreased endurance, Decreased static/dynamic balance and reactive control, Decreased activity endurance, Decompression, Ease of movement and Low impact and reduced weight bearing activity.                                           Date: 2/13/23  Visit 8  PN 2/15/23  Visit 9 2/20/23  Visit 10 2/27/23  Visit 11   Manual Therapy: 30 mins 30 mins 30 mins 30 mins   STM cervical seated seated seated seated   Cervical distraction supine supine            Therapeutic Exercises: 25 mins 25 mins 25 mins 25 mins   Pt education, postural education, HEP, functional breathing, sleep hygiene       UT stretch passive passive passive passive   Levator stretch passive passive passive passive   Scalene, SCM stretches B passive passive passive passive   PROM cervical rotations seated supine seated seated   Chin tuck seated supine seated seated          HEP: see hand out       Treatment/Session Summary:    Treatment Assessment:  Good response. L tricep DTR remains diminished. MMT was normalized. Compliant with suggestions. Will call with any questions.    Communication/Consultation:  None today  Equipment provided today:  None    Total Treatment Billable Duration:  55 minutes  Time In: 0845  Time Out: 0940    Olga Valadez PT       Charge Capture  }Post Session Pain  PT Visit Info  [employer] Portal  MD Guidelines  Scanned Media  Benefits  MyChart    Future Appointments   Date Time Provider Bran Olea   3/1/2023  8:45 AM Jurgen Mclean, PT [facility]   3/24/2023 11:30 AM SFE DEXA BI GE LUNAR DEXA SFERMAM E   3/24/2023 12:00 PM SFE LIZA BI ROOM 4 SFERMAM SFE   6/26/2023  8:45 AM TRIM LAB RESOURCE TRIM GVL AMB   7/3/2023  9:00 AM Rigo Shukla,  TRIM GVL AMB

## 2023-03-24 ENCOUNTER — HOSPITAL ENCOUNTER (OUTPATIENT)
Dept: MAMMOGRAPHY | Age: 73
End: 2023-03-24
Payer: MEDICARE

## 2023-03-24 DIAGNOSIS — Z78.0 POSTMENOPAUSAL: ICD-10-CM

## 2023-03-24 DIAGNOSIS — Z12.31 VISIT FOR SCREENING MAMMOGRAM: ICD-10-CM

## 2023-03-24 PROCEDURE — 77063 BREAST TOMOSYNTHESIS BI: CPT

## 2023-03-24 PROCEDURE — 77080 DXA BONE DENSITY AXIAL: CPT

## 2023-06-19 RX ORDER — IRBESARTAN AND HYDROCHLOROTHIAZIDE 300; 12.5 MG/1; MG/1
1 TABLET, FILM COATED ORAL DAILY
Qty: 90 TABLET | Refills: 3 | Status: SHIPPED | OUTPATIENT
Start: 2023-06-19

## 2023-06-26 ENCOUNTER — NURSE ONLY (OUTPATIENT)
Dept: INTERNAL MEDICINE CLINIC | Facility: CLINIC | Age: 73
End: 2023-06-26

## 2023-06-26 DIAGNOSIS — I10 PRIMARY HYPERTENSION: Primary | ICD-10-CM

## 2023-06-26 DIAGNOSIS — I10 PRIMARY HYPERTENSION: ICD-10-CM

## 2023-06-26 LAB
ERYTHROCYTE [DISTWIDTH] IN BLOOD BY AUTOMATED COUNT: 14 % (ref 11.9–14.6)
HCT VFR BLD AUTO: 38.2 % (ref 35.8–46.3)
HGB BLD-MCNC: 12.2 G/DL (ref 11.7–15.4)
MCH RBC QN AUTO: 29.1 PG (ref 26.1–32.9)
MCHC RBC AUTO-ENTMCNC: 31.9 G/DL (ref 31.4–35)
MCV RBC AUTO: 91.2 FL (ref 82–102)
NRBC # BLD: 0 K/UL (ref 0–0.2)
PLATELET # BLD AUTO: 276 K/UL (ref 150–450)
PMV BLD AUTO: 10.1 FL (ref 9.4–12.3)
RBC # BLD AUTO: 4.19 M/UL (ref 4.05–5.2)
WBC # BLD AUTO: 5.3 K/UL (ref 4.3–11.1)

## 2023-06-27 LAB
ALBUMIN SERPL-MCNC: 3.4 G/DL (ref 3.2–4.6)
ALBUMIN/GLOB SERPL: 1 (ref 0.4–1.6)
ALP SERPL-CCNC: 67 U/L (ref 50–136)
ALT SERPL-CCNC: 24 U/L (ref 12–65)
ANION GAP SERPL CALC-SCNC: 4 MMOL/L (ref 2–11)
AST SERPL-CCNC: 15 U/L (ref 15–37)
BILIRUB SERPL-MCNC: 0.4 MG/DL (ref 0.2–1.1)
BUN SERPL-MCNC: 21 MG/DL (ref 8–23)
CALCIUM SERPL-MCNC: 9.1 MG/DL (ref 8.3–10.4)
CHLORIDE SERPL-SCNC: 106 MMOL/L (ref 101–110)
CHOLEST SERPL-MCNC: 208 MG/DL
CO2 SERPL-SCNC: 27 MMOL/L (ref 21–32)
CREAT SERPL-MCNC: 1.1 MG/DL (ref 0.6–1)
GLOBULIN SER CALC-MCNC: 3.4 G/DL (ref 2.8–4.5)
GLUCOSE SERPL-MCNC: 96 MG/DL (ref 65–100)
HDLC SERPL-MCNC: 105 MG/DL (ref 40–60)
HDLC SERPL: 2
LDLC SERPL CALC-MCNC: 95 MG/DL
POTASSIUM SERPL-SCNC: 3.7 MMOL/L (ref 3.5–5.1)
PROT SERPL-MCNC: 6.8 G/DL (ref 6.3–8.2)
SODIUM SERPL-SCNC: 137 MMOL/L (ref 133–143)
TRIGL SERPL-MCNC: 40 MG/DL (ref 35–150)
TSH, 3RD GENERATION: 2.36 UIU/ML (ref 0.36–3.74)
VLDLC SERPL CALC-MCNC: 8 MG/DL (ref 6–23)

## 2023-07-08 SDOH — ECONOMIC STABILITY: TRANSPORTATION INSECURITY
IN THE PAST 12 MONTHS, HAS LACK OF TRANSPORTATION KEPT YOU FROM MEETINGS, WORK, OR FROM GETTING THINGS NEEDED FOR DAILY LIVING?: NO

## 2023-07-08 SDOH — ECONOMIC STABILITY: INCOME INSECURITY: HOW HARD IS IT FOR YOU TO PAY FOR THE VERY BASICS LIKE FOOD, HOUSING, MEDICAL CARE, AND HEATING?: NOT HARD AT ALL

## 2023-07-08 SDOH — HEALTH STABILITY: PHYSICAL HEALTH: ON AVERAGE, HOW MANY DAYS PER WEEK DO YOU ENGAGE IN MODERATE TO STRENUOUS EXERCISE (LIKE A BRISK WALK)?: 3 DAYS

## 2023-07-08 SDOH — ECONOMIC STABILITY: HOUSING INSECURITY
IN THE LAST 12 MONTHS, WAS THERE A TIME WHEN YOU DID NOT HAVE A STEADY PLACE TO SLEEP OR SLEPT IN A SHELTER (INCLUDING NOW)?: NO

## 2023-07-08 SDOH — ECONOMIC STABILITY: FOOD INSECURITY: WITHIN THE PAST 12 MONTHS, THE FOOD YOU BOUGHT JUST DIDN'T LAST AND YOU DIDN'T HAVE MONEY TO GET MORE.: NEVER TRUE

## 2023-07-08 SDOH — ECONOMIC STABILITY: FOOD INSECURITY: WITHIN THE PAST 12 MONTHS, YOU WORRIED THAT YOUR FOOD WOULD RUN OUT BEFORE YOU GOT MONEY TO BUY MORE.: NEVER TRUE

## 2023-07-08 SDOH — HEALTH STABILITY: PHYSICAL HEALTH: ON AVERAGE, HOW MANY MINUTES DO YOU ENGAGE IN EXERCISE AT THIS LEVEL?: 30 MIN

## 2023-07-08 ASSESSMENT — LIFESTYLE VARIABLES
HOW OFTEN DO YOU HAVE A DRINK CONTAINING ALCOHOL: 4 OR MORE TIMES A WEEK
HOW OFTEN DO YOU HAVE SIX OR MORE DRINKS ON ONE OCCASION: 1
HAS A RELATIVE, FRIEND, DOCTOR, OR ANOTHER HEALTH PROFESSIONAL EXPRESSED CONCERN ABOUT YOUR DRINKING OR SUGGESTED YOU CUT DOWN: NO
HAS A RELATIVE, FRIEND, DOCTOR, OR ANOTHER HEALTH PROFESSIONAL EXPRESSED CONCERN ABOUT YOUR DRINKING OR SUGGESTED YOU CUT DOWN: 0
HOW MANY STANDARD DRINKS CONTAINING ALCOHOL DO YOU HAVE ON A TYPICAL DAY: 1
HOW OFTEN DURING THE LAST YEAR HAVE YOU FOUND THAT YOU WERE NOT ABLE TO STOP DRINKING ONCE YOU HAD STARTED: 0
HOW OFTEN DURING THE LAST YEAR HAVE YOU BEEN UNABLE TO REMEMBER WHAT HAPPENED THE NIGHT BEFORE BECAUSE YOU HAD BEEN DRINKING: 0
HOW OFTEN DURING THE LAST YEAR HAVE YOU HAD A FEELING OF GUILT OR REMORSE AFTER DRINKING: NEVER
HOW OFTEN DURING THE LAST YEAR HAVE YOU FOUND THAT YOU WERE NOT ABLE TO STOP DRINKING ONCE YOU HAD STARTED: NEVER
HOW MANY STANDARD DRINKS CONTAINING ALCOHOL DO YOU HAVE ON A TYPICAL DAY: 1 OR 2
HOW OFTEN DO YOU HAVE A DRINK CONTAINING ALCOHOL: 5
HOW OFTEN DURING THE LAST YEAR HAVE YOU BEEN UNABLE TO REMEMBER WHAT HAPPENED THE NIGHT BEFORE BECAUSE YOU HAD BEEN DRINKING: NEVER
HOW OFTEN DURING THE LAST YEAR HAVE YOU NEEDED AN ALCOHOLIC DRINK FIRST THING IN THE MORNING TO GET YOURSELF GOING AFTER A NIGHT OF HEAVY DRINKING: 0
HOW OFTEN DURING THE LAST YEAR HAVE YOU HAD A FEELING OF GUILT OR REMORSE AFTER DRINKING: 0
HAVE YOU OR SOMEONE ELSE BEEN INJURED AS A RESULT OF YOUR DRINKING: 0
HAVE YOU OR SOMEONE ELSE BEEN INJURED AS A RESULT OF YOUR DRINKING: NO
HOW OFTEN DURING THE LAST YEAR HAVE YOU FAILED TO DO WHAT WAS NORMALLY EXPECTED FROM YOU BECAUSE OF DRINKING: NEVER
HOW OFTEN DURING THE LAST YEAR HAVE YOU FAILED TO DO WHAT WAS NORMALLY EXPECTED FROM YOU BECAUSE OF DRINKING: 0
HOW OFTEN DURING THE LAST YEAR HAVE YOU NEEDED AN ALCOHOLIC DRINK FIRST THING IN THE MORNING TO GET YOURSELF GOING AFTER A NIGHT OF HEAVY DRINKING: NEVER

## 2023-07-08 ASSESSMENT — PATIENT HEALTH QUESTIONNAIRE - PHQ9
SUM OF ALL RESPONSES TO PHQ9 QUESTIONS 1 & 2: 0
SUM OF ALL RESPONSES TO PHQ QUESTIONS 1-9: 0
1. LITTLE INTEREST OR PLEASURE IN DOING THINGS: 0
2. FEELING DOWN, DEPRESSED OR HOPELESS: 0
SUM OF ALL RESPONSES TO PHQ QUESTIONS 1-9: 0

## 2023-07-10 ENCOUNTER — OFFICE VISIT (OUTPATIENT)
Dept: INTERNAL MEDICINE CLINIC | Facility: CLINIC | Age: 73
End: 2023-07-10
Payer: MEDICARE

## 2023-07-10 VITALS
SYSTOLIC BLOOD PRESSURE: 130 MMHG | TEMPERATURE: 97.9 F | BODY MASS INDEX: 26.11 KG/M2 | OXYGEN SATURATION: 98 % | HEIGHT: 60 IN | WEIGHT: 133 LBS | DIASTOLIC BLOOD PRESSURE: 60 MMHG | HEART RATE: 74 BPM

## 2023-07-10 DIAGNOSIS — Z00.00 MEDICARE ANNUAL WELLNESS VISIT, SUBSEQUENT: Primary | ICD-10-CM

## 2023-07-10 DIAGNOSIS — R25.2 MUSCLE CRAMPS AT NIGHT: ICD-10-CM

## 2023-07-10 DIAGNOSIS — G25.81 RESTLESS LEG SYNDROME: ICD-10-CM

## 2023-07-10 DIAGNOSIS — I10 PRIMARY HYPERTENSION: ICD-10-CM

## 2023-07-10 DIAGNOSIS — F51.04 PSYCHOPHYSIOLOGICAL INSOMNIA: ICD-10-CM

## 2023-07-10 PROCEDURE — 3078F DIAST BP <80 MM HG: CPT | Performed by: INTERNAL MEDICINE

## 2023-07-10 PROCEDURE — G0439 PPPS, SUBSEQ VISIT: HCPCS | Performed by: INTERNAL MEDICINE

## 2023-07-10 PROCEDURE — 3017F COLORECTAL CA SCREEN DOC REV: CPT | Performed by: INTERNAL MEDICINE

## 2023-07-10 PROCEDURE — 1123F ACP DISCUSS/DSCN MKR DOCD: CPT | Performed by: INTERNAL MEDICINE

## 2023-07-10 PROCEDURE — 3074F SYST BP LT 130 MM HG: CPT | Performed by: INTERNAL MEDICINE

## 2023-07-10 NOTE — PROGRESS NOTES
Medicare Annual Wellness Visit    Mary Bentley is here for Medicare AWV    Assessment & Plan   Medicare annual wellness visit, subsequent  Primary hypertension  -     CBC with Auto Differential; Future  -     Comprehensive Metabolic Panel; Future  -     TSH; Future  -     Magnesium; Future  -     Microalbumin / Creatinine Urine Ratio; Future  Psychophysiological insomnia  Muscle cramps at night  Comments:  not taking ppi regulrly  Orders:  -     CBC with Auto Differential; Future  -     Comprehensive Metabolic Panel; Future  -     TSH; Future  -     Magnesium; Future  -     Microalbumin / Creatinine Urine Ratio; Future  Restless leg syndrome  -     CBC with Auto Differential; Future  -     Comprehensive Metabolic Panel; Future  -     TSH; Future  -     Magnesium; Future  -     Microalbumin / Creatinine Urine Ratio; Future  Recommendations for Preventive Services Due: see orders and patient instructions/AVS.  Recommended screening schedule for the next 5-10 years is provided to the patient in written form: see Patient Instructions/AVS.     Return in 1 year (on 7/10/2024). Subjective   The following acute and/or chronic problems were also addressed today:  Geni Phelps was seen today for medicare awv. Diagnoses and all orders for this visit:    Medicare annual wellness visit, subsequent    Primary hypertension  -     CBC with Auto Differential; Future  -     Comprehensive Metabolic Panel; Future  -     TSH; Future  -     Magnesium; Future  -     Microalbumin / Creatinine Urine Ratio; Future    Psychophysiological insomnia    Muscle cramps at night  Comments:  not taking ppi regulrly  Orders:  -     CBC with Auto Differential; Future  -     Comprehensive Metabolic Panel; Future  -     TSH; Future  -     Magnesium; Future  -     Microalbumin / Creatinine Urine Ratio; Future    Restless leg syndrome  -     CBC with Auto Differential; Future  -     Comprehensive Metabolic Panel; Future  -     TSH;  Future  -

## 2023-11-28 ENCOUNTER — OFFICE VISIT (OUTPATIENT)
Dept: INTERNAL MEDICINE CLINIC | Facility: CLINIC | Age: 73
End: 2023-11-28
Payer: MEDICARE

## 2023-11-28 VITALS
HEART RATE: 80 BPM | BODY MASS INDEX: 26.31 KG/M2 | TEMPERATURE: 98.8 F | HEIGHT: 60 IN | OXYGEN SATURATION: 98 % | WEIGHT: 134 LBS | DIASTOLIC BLOOD PRESSURE: 60 MMHG | SYSTOLIC BLOOD PRESSURE: 122 MMHG

## 2023-11-28 DIAGNOSIS — M25.552 ACUTE HIP PAIN, LEFT: Primary | ICD-10-CM

## 2023-11-28 PROCEDURE — G8427 DOCREV CUR MEDS BY ELIG CLIN: HCPCS | Performed by: INTERNAL MEDICINE

## 2023-11-28 PROCEDURE — G8399 PT W/DXA RESULTS DOCUMENT: HCPCS | Performed by: INTERNAL MEDICINE

## 2023-11-28 PROCEDURE — 1036F TOBACCO NON-USER: CPT | Performed by: INTERNAL MEDICINE

## 2023-11-28 PROCEDURE — 99214 OFFICE O/P EST MOD 30 MIN: CPT | Performed by: INTERNAL MEDICINE

## 2023-11-28 PROCEDURE — 1090F PRES/ABSN URINE INCON ASSESS: CPT | Performed by: INTERNAL MEDICINE

## 2023-11-28 PROCEDURE — G8417 CALC BMI ABV UP PARAM F/U: HCPCS | Performed by: INTERNAL MEDICINE

## 2023-11-28 PROCEDURE — 3017F COLORECTAL CA SCREEN DOC REV: CPT | Performed by: INTERNAL MEDICINE

## 2023-11-28 PROCEDURE — 1123F ACP DISCUSS/DSCN MKR DOCD: CPT | Performed by: INTERNAL MEDICINE

## 2023-11-28 PROCEDURE — G8484 FLU IMMUNIZE NO ADMIN: HCPCS | Performed by: INTERNAL MEDICINE

## 2023-11-28 RX ORDER — MELOXICAM 15 MG/1
15 TABLET ORAL DAILY
Qty: 30 TABLET | Refills: 0 | Status: SHIPPED | OUTPATIENT
Start: 2023-11-28

## 2023-11-28 ASSESSMENT — ENCOUNTER SYMPTOMS: CHEST TIGHTNESS: 0

## 2023-11-28 NOTE — PROGRESS NOTES
Shepard Alpers was seen today for hip pain. Diagnoses and all orders for this visit:    Acute hip pain, left  -     [address] - Physical Therapy, [facility]    Other orders  -     meloxicam (MOBIC) 15 MG tablet; Take 1 tablet by mouth daily          Janet Tavares is a 68 y.o. female    Chief Complaint   Patient presents with    Hip Pain      Left top of leg and into buttock x 6 weeks. Wants referral for physical therapy   Better sitting  No injury'  Golf was ok  Could not play pickleball      Nurse Only on 06/26/2023   Component Date Value Ref Range Status    Cholesterol, Total 06/26/2023 208 (H)  <200 MG/DL Final    Comment: Borderline High: 200-239 mg/dL  High: Greater than or equal to 240 mg/dL      Triglycerides 06/26/2023 40  35 - 150 MG/DL Final    Comment: Borderline High: 150-199 mg/dL, High: 200-499 mg/dL  Very High: Greater than or equal to 500 mg/dL      HDL 06/26/2023 105 (H)  40 - 60 MG/DL Final    LDL Calculated 06/26/2023 95  <100 MG/DL Final    Comment: Near Optimal: 100-129 mg/dL  Borderline High: 130-159, High: 160-189 mg/dL  Very High: Greater than or equal to 190 mg/dL      VLDL Cholesterol Calculated 06/26/2023 8  6.0 - 23.0 MG/DL Final    Chol/HDL Ratio 06/26/2023 2.0    Final    TSH, 3RD GENERATION 06/26/2023 2.360  0.358 - 3.740 uIU/mL Final    Sodium 06/26/2023 137  133 - 143 mmol/L Final    Potassium 06/26/2023 3.7  3.5 - 5.1 mmol/L Final    Chloride 06/26/2023 106  101 - 110 mmol/L Final    CO2 06/26/2023 27  21 - 32 mmol/L Final    Anion Gap 06/26/2023 4  2 - 11 mmol/L Final    Glucose 06/26/2023 96  65 - 100 mg/dL Final    BUN 06/26/2023 21  8 - 23 MG/DL Final    Creatinine 06/26/2023 1.10 (H)  0.6 - 1.0 MG/DL Final    Est, Glom Filt Rate 06/26/2023 53 (L)  >60 ml/min/1.73m2 Final    Comment:   Pediatric calculator link: [employer]. org/professionals/kdoqi/gfr_calculatorped    These results are not intended for use in patients <25years of age.     eGFR results are

## 2023-12-05 ENCOUNTER — HOSPITAL ENCOUNTER (OUTPATIENT)
Dept: PHYSICAL THERAPY | Age: 73
Setting detail: RECURRING SERIES
Discharge: HOME | End: 2023-12-08
Attending: INTERNAL MEDICINE
Payer: MEDICARE

## 2023-12-05 DIAGNOSIS — M25.651 STIFFNESS OF HIP JOINT, RIGHT: ICD-10-CM

## 2023-12-05 DIAGNOSIS — M25.652 STIFFNESS OF HIP JOINT, LEFT: ICD-10-CM

## 2023-12-05 DIAGNOSIS — M54.59 OTHER LOW BACK PAIN: ICD-10-CM

## 2023-12-05 DIAGNOSIS — M62.81 MUSCLE WEAKNESS (GENERALIZED): ICD-10-CM

## 2023-12-05 DIAGNOSIS — M25.552 LEFT HIP PAIN: Primary | ICD-10-CM

## 2023-12-05 PROCEDURE — 97110 THERAPEUTIC EXERCISES: CPT

## 2023-12-05 PROCEDURE — 97140 MANUAL THERAPY 1/> REGIONS: CPT

## 2023-12-05 PROCEDURE — 97162 PT EVAL MOD COMPLEX 30 MIN: CPT

## 2023-12-05 ASSESSMENT — PAIN SCALES - GENERAL: PAINLEVEL_OUTOF10: 0

## 2023-12-05 NOTE — PROGRESS NOTES
Melyssa Lung  : 1950  Primary: Medicare Part A And B (Medicare)  Secondary: [address] @ [address] [address] [facility]  [place] [facility] [address]  Phone: [phone]  Fax: [phone] Plan Frequency: 2 times/week    Plan of Care/Certification Expiration Date: 24      PT Visit Info:  Plan Frequency: 2 times/week  Plan of Care/Certification Expiration Date: 24      Visit Count:  1    OUTPATIENT PHYSICAL THERAPY: Treatment Note 2023       Episode  }Appt Desk             Treatment Diagnosis:    Left hip pain  Stiffness of hip joint, left  Stiffness of hip joint, right  Muscle weakness (generalized)  Other low back pain    Goals: (Goals have been discussed and agreed upon with patient.)  Short-Term Functional Goals: Time Frame: 4 weeks  Pt to report compliance with HEP  Pt to demonstrate fluid movement patterns without cues  Discharge Goals: Time Frame: 8 weeks  Pt to increase strength for sit to stand x 30 reps without UE assist, with correct alignment  Pt to normalize hip flex L to eliminate pain with stretching  Pt to report corby to walking without hip pain    Medical/Referring Diagnosis:      Referring Physician:  Kalina Downs DO MD Orders:  PT Eval and Treat   Date of Onset:  Onset Date: 10/30/23     Allergies:   Patient has no known allergies. Restrictions/Precautions:  No data recordedNo data recorded   Interventions Planned (Treatment may consist of any combination of the following):    Current Treatment Recommendations: Strengthening; ROM; Manual; Pain management; Home exercise program     Subjective Comments:Pt presents with L ant-post hip pain due to weakness, stiffness, faulty movement patterns, SIJ irritability.   Initial: 0/10                            Post Session:  0/10  Medications Last Reviewed:  2023  Updated Objective Findings:  See Evaluation Note from today    Observation, Palpation, and Special Tests   Symm pelvic

## 2023-12-05 NOTE — THERAPY EVALUATION
Frida Ritchie  : 1950  Primary: Medicare Part A And B (Medicare)  Secondary: [address] @ [address] [address] [facility] [address]  Phone: [phone]  Fax: [phone] Plan Frequency: 2 times/week    Plan of Care/Certification Expiration Date: 24      PT Visit Info:  Plan Frequency: 2 times/week  Plan of Care/Certification Expiration Date: 24      Visit Count:  1                OUTPATIENT PHYSICAL THERAPY:             Initial Assessment 2023               Episode (L hip pain) Appt Desk         Treatment Diagnosis:    Left hip pain  Stiffness of hip joint, left  Stiffness of hip joint, right  Muscle weakness (generalized)  Other low back pain  Medical/Referring Diagnosis:      Referring Physician:  Fabrizio Kaufman DO MD Orders:  PT Eval and Treat   Return MD Appt:  none  Date of Onset:  Onset Date: 10/30/23      Allergies:  Patient has no known allergies. Restrictions/Precautions:         Medications Last Reviewed:  2023     SUBJECTIVE   History of Injury/Illness (Reason for Referral):  Pt reports L ant hip pain that radiates to post hip. Pain started after 2 trips in Oct.  Aches with standing, pain with walking. Can't play pickle ball. Is taking meloxicam.  No help. No pain sitting. Aches at night unless she is on her back.  synovial cyst removed from L4-5 and then returned on the other side and several months later had it removed. Patient Stated Goal(s):  \"relieve pain\"  Initial:     010 Post Session:     010  Past Medical History/Comorbidities:   Ms. Daniella Schuler  has a past medical history of Hypertension, Insomnia, Osteopenia, and Restless leg syndrome. Ms. Daniella Schuler  has a past surgical history that includes Cataract removal (2019); Cholecystectomy; gyn (); and orthopedic surgery ().   Social History/Living Environment:   Home Layout: One level     Prior Level of Function/Work/Activity:   Occupation:

## 2023-12-07 ENCOUNTER — HOSPITAL ENCOUNTER (OUTPATIENT)
Dept: PHYSICAL THERAPY | Age: 73
Setting detail: RECURRING SERIES
Discharge: HOME | End: 2023-12-10
Attending: INTERNAL MEDICINE
Payer: MEDICARE

## 2023-12-07 PROCEDURE — 97110 THERAPEUTIC EXERCISES: CPT

## 2023-12-07 PROCEDURE — 97140 MANUAL THERAPY 1/> REGIONS: CPT

## 2023-12-07 NOTE — PROGRESS NOTES
Lelia Vincent  : 1950  Primary: Medicare Part A And B (Medicare)  Secondary: [address] @ [address] [address] [facility] [address]  Phone: [phone]  Fax: [phone] Plan Frequency: 2 times/week    Plan of Care/Certification Expiration Date: 24      PT Visit Info:  Plan Frequency: 2 times/week  Plan of Care/Certification Expiration Date: 24      Visit Count:  2    OUTPATIENT PHYSICAL THERAPY: Treatment Note 2023       Episode  }Appt Desk             Treatment Diagnosis:    Left hip pain  Stiffness of hip joint, left  Stiffness of hip joint, right  Muscle weakness (generalized)  Other low back pain    Goals: (Goals have been discussed and agreed upon with patient.)  Short-Term Functional Goals: Time Frame: 4 weeks  Pt to report compliance with HEP  Pt to demonstrate fluid movement patterns without cues  Discharge Goals: Time Frame: 8 weeks  Pt to increase strength for sit to stand x 30 reps without UE assist, with correct alignment  Pt to normalize hip flex L to eliminate pain with stretching  Pt to report corby to walking without hip pain    Medical/Referring Diagnosis:      Referring Physician:  Renan Harvey DO MD Orders:  PT Eval and Treat   Date of Onset:  Onset Date: 10/30/23     Allergies:   Patient has no known allergies. Restrictions/Precautions:  No data recordedNo data recorded   Interventions Planned (Treatment may consist of any combination of the following):    Current Treatment Recommendations: Strengthening; ROM; Manual; Pain management; Home exercise program     Subjective Comments:   I was able to stand a couple of hours last night. No pain but it was sore. I was shocked. I slept better. Early this morning I turned over and had that sharp pain but that was it.   Initial: 0/10                            Post Session:  0/10  Medications Last Reviewed:  2023  Updated Objective Findings:      Observation,

## 2023-12-12 ENCOUNTER — HOSPITAL ENCOUNTER (OUTPATIENT)
Dept: PHYSICAL THERAPY | Age: 73
Setting detail: RECURRING SERIES
Discharge: HOME | End: 2023-12-15
Attending: INTERNAL MEDICINE
Payer: MEDICARE

## 2023-12-12 PROCEDURE — 97140 MANUAL THERAPY 1/> REGIONS: CPT

## 2023-12-12 PROCEDURE — 97110 THERAPEUTIC EXERCISES: CPT

## 2023-12-12 NOTE — PROGRESS NOTES
X 10 X 30      Clams L X 10 X 30      Piriformis stretch B seated 3 x 15 sec 3 x 15 sec     Seated trunk flex stretch   X 5     Sit to stand  X 5 X 20 X 20             HEP: see hand out       Treatment/Session Summary:    Treatment Assessment:  Review of stretching multiple times/day, lifting/standing mechanics to decrease load on back extensors. Less paraspinal tightness after STM. Communication/Consultation:  None today  Equipment provided today:  None  Recommendations/Intent for next treatment session: Next visit will focus on stretching, strengthening, manual techniques as indicated.     Total Treatment Billable Duration:  42 minutes  Time In: 0800  Time Out: 3098    Arley Lanes, PT       Charge Capture  }Post Session Pain  PT Visit Info  [email or web address] Portal  MD Guidelines  Scanned Media  Benefits  [employer]    Future Appointments   Date Time Provider [address]   12/13/2023  4:00 PM Leodan Putnam [facility]   12/19/2023 11:00 AM Gordy Baron, PT [facility]   12/21/2023  9:30 AM Gordy Baron, PT [facility]   12/28/2023  1:30 PM Gordy Baron, PT [facility]   7/1/2024  8:15 AM TRIM LAB RESOURCE TRIM GVL AMB   7/15/2024  9:30 AM Miranda ANDREA DO TRIM GVL AMB

## 2023-12-13 ENCOUNTER — HOSPITAL ENCOUNTER (OUTPATIENT)
Dept: PHYSICAL THERAPY | Age: 73
Setting detail: RECURRING SERIES
Discharge: HOME | End: 2023-12-16
Attending: INTERNAL MEDICINE
Payer: MEDICARE

## 2023-12-13 PROCEDURE — 97110 THERAPEUTIC EXERCISES: CPT

## 2023-12-13 PROCEDURE — 97140 MANUAL THERAPY 1/> REGIONS: CPT

## 2023-12-13 NOTE — PROGRESS NOTES
Pt education, postural education, HEP, functional breathing 18 mins  Lifting, standing mechanics review    S/L hip abd L X 10 X 30      Clams L X 10 X 30      Piriformis stretch B seated 3 x 15 sec 3 x 15 sec 3 x 15 sec    Seated trunk flex stretch   X 5 X 5    Sit to stand  X 5 X 20 X 20 X 20            HEP: see hand out       Treatment/Session Summary:    Treatment Assessment:  R paraspinals remain tighter than L. Pain resolves when pt able to move without splinting trunk. Working on learning to stand with staggered stance posture to load LEs and not back. Good effort and response. Communication/Consultation:  None today  Equipment provided today:  None  Recommendations/Intent for next treatment session: Next visit will focus on stretching, strengthening, manual techniques as indicated.     Total Treatment Billable Duration:  38 minutes  Time In: 0400  Time Out: 6178    Lyly Winn, PT       Charge Capture  }Post Session Pain  PT Visit Info  [employer] Portal  MD Guidelines  Scanned Media  Benefits  MyChart    Future Appointments   Date Time Provider [address]   12/19/2023 11:00 AM [facility]   12/21/2023  9:30 AM Kate Baron PT [facility]   12/28/2023  1:30 PM Kate Baron PT [facility]   7/1/2024  8:15 AM TRIM LAB RESOURCE TRIM GVL AMB   7/15/2024  9:30 AM Titi ANDREA DO TRIM [facility]VL AMB

## 2023-12-14 ENCOUNTER — APPOINTMENT (OUTPATIENT)
Dept: PHYSICAL THERAPY | Age: 73
End: 2023-12-14
Attending: INTERNAL MEDICINE
Payer: MEDICARE

## 2023-12-21 ENCOUNTER — HOSPITAL ENCOUNTER (OUTPATIENT)
Dept: PHYSICAL THERAPY | Age: 73
Setting detail: RECURRING SERIES
Discharge: HOME | End: 2023-12-24
Attending: INTERNAL MEDICINE
Payer: MEDICARE

## 2023-12-21 PROCEDURE — 97110 THERAPEUTIC EXERCISES: CPT

## 2023-12-21 PROCEDURE — 97140 MANUAL THERAPY 1/> REGIONS: CPT

## 2023-12-21 NOTE — PROGRESS NOTES
Kisha Ramoseliana  : 1950  Primary: Medicare Part A And B (Medicare)  Secondary: [address] @ [address] [facility] [address]  Phone: [phone]  Fax: [phone] Plan Frequency: 2 times/week    Plan of Care/Certification Expiration Date: 24      PT Visit Info:  Plan Frequency: 2 times/week  Plan of Care/Certification Expiration Date: 24      Visit Count:  6    OUTPATIENT PHYSICAL THERAPY: Treatment Note 2023       Episode  }Appt Desk             Treatment Diagnosis:    Left hip pain  Stiffness of hip joint, left  Stiffness of hip joint, right  Muscle weakness (generalized)  Other low back pain    Goals: (Goals have been discussed and agreed upon with patient.)  Short-Term Functional Goals: Time Frame: 4 weeks  Pt to report compliance with HEP  Pt to demonstrate fluid movement patterns without cues  Discharge Goals: Time Frame: 8 weeks  Pt to increase strength for sit to stand x 30 reps without UE assist, with correct alignment  Pt to normalize hip flex L to eliminate pain with stretching  Pt to report corby to walking without hip pain    Medical/Referring Diagnosis:      Referring Physician:  Harris Palomo DO MD Orders:  PT Eval and Treat   Date of Onset:  Onset Date: 10/30/23     Allergies:   Patient has no known allergies. Restrictions/Precautions:  No data recordedNo data recorded   Interventions Planned (Treatment may consist of any combination of the following):    Current Treatment Recommendations: Strengthening; ROM; Manual; Pain management; Home exercise program     Subjective Comments:   I am feeling okay now. Getting out of bed was a little tight but it goes away when I stretch.     Initial: 0/10                            Post Session:  0/10  Medications Last Reviewed:  2023  Updated Objective Findings:      Observation, Palpation, and Special Tests   Symm pelvic landmarks  Flat lumbar spine  Neg FADIR,

## 2023-12-28 ENCOUNTER — HOSPITAL ENCOUNTER (OUTPATIENT)
Dept: PHYSICAL THERAPY | Age: 73
Setting detail: RECURRING SERIES
Discharge: HOME | End: 2023-12-31
Attending: INTERNAL MEDICINE
Payer: MEDICARE

## 2023-12-28 PROCEDURE — 97110 THERAPEUTIC EXERCISES: CPT

## 2023-12-28 PROCEDURE — 97140 MANUAL THERAPY 1/> REGIONS: CPT

## 2023-12-28 NOTE — PROGRESS NOTES
Josey Black  : 1950  Primary: Medicare Part A And B (Medicare)  Secondary: [employer] S[employer]IOR MEDICARE SUPP [facility] [place] [address] [facility] [facility] [address]  Phone: [phone]  Fax: [phone] Plan Frequency: 2 times/week    Plan of Care/Certification Expiration Date: 24      PT Visit Info:  Plan Frequency: 2 times/week  Plan of Care/Certification Expiration Date: 24      Visit Count:  7    OUTPATIENT PHYSICAL THERAPY: Treatment Note 2023       Episode  }Appt Desk             Treatment Diagnosis:    Left hip pain  Stiffness of hip joint, left  Stiffness of hip joint, right  Muscle weakness (generalized)  Other low back pain    Goals: (Goals have been discussed and agreed upon with patient.)  Short-Term Functional Goals: Time Frame: 4 weeks  Pt to report compliance with HEP  Pt to demonstrate fluid movement patterns without cues  Discharge Goals: Time Frame: 8 weeks  Pt to increase strength for sit to stand x 30 reps without UE assist, with correct alignment  Pt to normalize hip flex L to eliminate pain with stretching  Pt to report corby to walking without hip pain    Medical/Referring Diagnosis:      Referring Physician:  Rigo Shukla DO MD Orders:  PT Eval and Treat   Date of Onset:  Onset Date: 10/30/23     Allergies:   Patient has no known allergies.  Restrictions/Precautions:  No data recordedNo data recorded   Interventions Planned (Treatment may consist of any combination of the following):    Current Treatment Recommendations: Strengthening; ROM; Manual; Pain management; Home exercise program     Subjective Comments:    I think I did okay over the weekend.  I have been very busy.  Initial: 0/10                            Post Session:  0/10  Medications Last Reviewed:  2023  Updated Objective Findings:      Observation, Palpation, and Special Tests   Symm pelvic landmarks  Flat lumbar spine  Neg FADUMO BROWN         ROM   Trunk WNL  Tight

## 2023-12-30 DIAGNOSIS — I10 PRIMARY HYPERTENSION: ICD-10-CM

## 2023-12-30 DIAGNOSIS — M85.80 OSTEOPENIA, UNSPECIFIED LOCATION: ICD-10-CM

## 2023-12-30 DIAGNOSIS — G25.81 RESTLESS LEG SYNDROME: ICD-10-CM

## 2024-01-02 ENCOUNTER — HOSPITAL ENCOUNTER (OUTPATIENT)
Dept: PHYSICAL THERAPY | Age: 74
Setting detail: RECURRING SERIES
Discharge: HOME | End: 2024-01-05
Attending: INTERNAL MEDICINE
Payer: MEDICARE

## 2024-01-02 PROCEDURE — 97140 MANUAL THERAPY 1/> REGIONS: CPT

## 2024-01-02 PROCEDURE — 97110 THERAPEUTIC EXERCISES: CPT

## 2024-01-02 RX ORDER — RALOXIFENE HYDROCHLORIDE 60 MG/1
60 TABLET, FILM COATED ORAL DAILY
Qty: 90 TABLET | Refills: 2 | Status: SHIPPED | OUTPATIENT
Start: 2024-01-02

## 2024-01-02 NOTE — PROGRESS NOTES
Josey Black  : 1950  Primary: Medicare Part A And B (Medicare)  Secondary: [employer] SENIOR MEDICARE SUPP [facility] [place] [address] [facility] [address]  Phone: [phone]  Fax: [phone] Plan Frequency: 2 times/week    Plan of Care/Certification Expiration Date: 24      PT Visit Info:  Plan Frequency: 2 times/week  Plan of Care/Certification Expiration Date: 24  Total # of Visits to Date: 1         OUTPATIENT PHYSICAL THERAPY: Treatment Note and Progress Note 2024       Episode  }Appt Desk             Treatment Diagnosis:    Left hip pain  Stiffness of hip joint, left  Stiffness of hip joint, right  Muscle weakness (generalized)  Other low back pain    Goals: (Goals have been discussed and agreed upon with patient.)  Short-Term Functional Goals: Time Frame: 4 weeks  Pt to report compliance with HEP - MET  Pt to demonstrate fluid movement patterns without cues - ongoing  Discharge Goals: Time Frame: 8 weeks  Pt to increase strength for sit to stand x 30 reps without UE assist, with correct alignment - MET  Pt to normalize hip flex L to eliminate pain with stretching - MET  Pt to report corby to walking without hip pain - MET    Medical/Referring Diagnosis:      Referring Physician:  Rigo Shukla DO MD Orders:  PT Eval and Treat   Date of Onset:  Onset Date: 10/30/23     Allergies:   Patient has no known allergies.  Restrictions/Precautions:  No data recordedNo data recorded   Interventions Planned (Treatment may consist of any combination of the following):    Current Treatment Recommendations: Strengthening; ROM; Manual; Pain management; Home exercise program     Subjective Comments:   I do think it is better.  My knee bothers me but I am not going to do anything about that.  It only bothers me when I lay on that side and then get up.  I feel it from the back of the hip to the front of the hip.   Initial: 0/10                            Post Session:

## 2024-01-04 ENCOUNTER — HOSPITAL ENCOUNTER (OUTPATIENT)
Dept: PHYSICAL THERAPY | Age: 74
Setting detail: RECURRING SERIES
Discharge: HOME | End: 2024-01-07
Attending: INTERNAL MEDICINE
Payer: MEDICARE

## 2024-01-04 PROCEDURE — 97140 MANUAL THERAPY 1/> REGIONS: CPT

## 2024-01-04 PROCEDURE — 97110 THERAPEUTIC EXERCISES: CPT

## 2024-01-04 NOTE — PROGRESS NOTES
Josey Black  : 1950  Primary: Medicare Part A And B (Medicare)  Secondary: [employer] MEDIC[employer] SUPP [facility] [place]  [place] [facility] [address]  Phone: [phone]  Fax: [phone] Plan Frequency: 2 times/week    Plan of Care/Certification Expiration Date: 24      PT Visit Info:  Plan Frequency: 2 times/week  Plan of Care/Certification Expiration Date: 24  Total # of Visits to Date: 2         OUTPATIENT PHYSICAL THERAPY: Treatment Note 2024       Episode  }Appt Desk             Treatment Diagnosis:    Left hip pain  Stiffness of hip joint, left  Stiffness of hip joint, right  Muscle weakness (generalized)  Other low back pain    Goals: (Goals have been discussed and agreed upon with patient.)  Short-Term Functional Goals: Time Frame: 4 weeks  Pt to report compliance with HEP - MET  Pt to demonstrate fluid movement patterns without cues - ongoing  Discharge Goals: Time Frame: 8 weeks  Pt to increase strength for sit to stand x 30 reps without UE assist, with correct alignment - MET  Pt to normalize hip flex L to eliminate pain with stretching - MET  Pt to report corby to walking without hip pain - MET    Medical/Referring Diagnosis:      Referring Physician:  Rigo Shukla DO MD Orders:  PT Eval and Treat   Date of Onset:  Onset Date: 10/30/23     Allergies:   Patient has no known allergies.  Restrictions/Precautions:  No data recordedNo data recorded   Interventions Planned (Treatment may consist of any combination of the following):    Current Treatment Recommendations: Strengthening; ROM; Manual; Pain management; Home exercise program     Subjective Comments:  I actually realized the pain in the front of the hip isn't really there anymore.  It only hurts on the side after about 4 in the morning when I go to move.   Initial: 0/10                            Post Session:  0/10  Medications Last Reviewed:  2024  Updated Objective

## 2024-01-09 ENCOUNTER — HOSPITAL ENCOUNTER (OUTPATIENT)
Dept: PHYSICAL THERAPY | Age: 74
Setting detail: RECURRING SERIES
Discharge: HOME | End: 2024-01-12
Attending: INTERNAL MEDICINE
Payer: MEDICARE

## 2024-01-09 PROCEDURE — 97110 THERAPEUTIC EXERCISES: CPT

## 2024-01-09 PROCEDURE — 97140 MANUAL THERAPY 1/> REGIONS: CPT

## 2024-01-09 NOTE — PROGRESS NOTES
breathing      bridging  X 20    S/L hip abd B X 40 X 40 X 40   Clams B X 40 X 40 X 40   Piriformis stretch B 3 x 15 sec 3 x 15 sec 3 x 15 sec   Seated trunk flex stretch X 3 X 3 X 5   Sit to stand  X 30 X 30 X 30         HEP: see hand out       Treatment/Session Summary:    Treatment Assessment:    Tight R > L paraspinals.  More fluid movements after session.  Communication/Consultation:  None today  Equipment provided today:  None  Recommendations/Intent for next treatment session: Next visit will focus on stretching, strengthening, manual techniques as indicated.    Total Treatment Billable Duration: 41 minutes  Time In: 1015  Time Out: 1056    MALA GARCIA, PT       Charge Capture  }Post Session Pain  PT Visit Info  [employer] Portal  MD Guidelines  Scanned Media  Benefits  MyChart    Future Appointments   Date Time Provider Department Center   1/11/2024 10:15 AM Mala Garcia, PT [facility]   1/25/2024 11:00 AM Mala Garcia, PT [facility]   1/29/2024  9:30 AM Mala Garcia, PT [facility]   2/1/2024  8:45 AM Mala Garcia, PT [facility]   7/1/2024  8:15 AM [facility] LAB RESOURCE [facility]   7/15/2024  9:30 AM Rigo Shukla DO [facility] AM[facility]

## 2024-01-11 ENCOUNTER — HOSPITAL ENCOUNTER (OUTPATIENT)
Dept: PHYSICAL THERAPY | Age: 74
Setting detail: RECURRING SERIES
Discharge: HOME | End: 2024-01-14
Attending: INTERNAL MEDICINE
Payer: MEDICARE

## 2024-01-11 PROCEDURE — 97140 MANUAL THERAPY 1/> REGIONS: CPT

## 2024-01-11 PROCEDURE — 97110 THERAPEUTIC EXERCISES: CPT

## 2024-01-11 NOTE — PROGRESS NOTES
Josey Black  : 1950  Primary: Medicare Part A And B (Medicare)  Secondary: [employer] MEDICARE SUPP [facility] @ [place] [address] [facility] [facility] [address]  Phone: [phone]  Fax: [phone] Plan Frequency: 2 times/week    Plan of Care/Certification Expiration Date: 24      PT Visit Info:  Plan Frequency: 2 times/week  Plan of Care/Certification Expiration Date: 24  Total # of Visits to Date: 4         OUTPATIENT PHYSICAL THERAPY: Treatment Note 2024       Episode  }Appt Desk             Treatment Diagnosis:    Left hip pain  Stiffness of hip joint, left  Stiffness of hip joint, right  Muscle weakness (generalized)  Other low back pain    Goals: (Goals have been discussed and agreed upon with patient.)  Short-Term Functional Goals: Time Frame: 4 weeks  Pt to report compliance with HEP - MET  Pt to demonstrate fluid movement patterns without cues - ongoing  Discharge Goals: Time Frame: 8 weeks  Pt to increase strength for sit to stand x 30 reps without UE assist, with correct alignment - MET  Pt to normalize hip flex L to eliminate pain with stretching - MET  Pt to report corby to walking without hip pain - MET    Medical/Referring Diagnosis:      Referring Physician:  Rigo Shukla DO MD Orders:  PT Eval and Treat   Date of Onset:  Onset Date: 10/30/23     Allergies:   Patient has no known allergies.  Restrictions/Precautions:  No data recordedNo data recorded   Interventions Planned (Treatment may consist of any combination of the following):    Current Treatment Recommendations: Strengthening; ROM; Manual; Pain management; Home exercise program     Subjective Comments:  It is not as sore.  I had to park in the other parking lot and realized it didn't hurt to walk over here.    Initial: 0/10                            Post Session:  0/10  Medications Last Reviewed:  2024  Updated Objective Findings:  24    Observation, Palpation, and

## 2024-01-15 ENCOUNTER — APPOINTMENT (OUTPATIENT)
Dept: PHYSICAL THERAPY | Age: 74
End: 2024-01-15
Attending: INTERNAL MEDICINE
Payer: MEDICARE

## 2024-01-17 ENCOUNTER — APPOINTMENT (OUTPATIENT)
Dept: PHYSICAL THERAPY | Age: 74
End: 2024-01-17
Attending: INTERNAL MEDICINE
Payer: MEDICARE

## 2024-01-23 ENCOUNTER — APPOINTMENT (OUTPATIENT)
Dept: PHYSICAL THERAPY | Age: 74
End: 2024-01-23
Attending: INTERNAL MEDICINE
Payer: MEDICARE

## 2024-01-25 ENCOUNTER — HOSPITAL ENCOUNTER (OUTPATIENT)
Dept: PHYSICAL THERAPY | Age: 74
Setting detail: RECURRING SERIES
Discharge: HOME | End: 2024-01-28
Attending: INTERNAL MEDICINE
Payer: MEDICARE

## 2024-01-25 PROCEDURE — 97140 MANUAL THERAPY 1/> REGIONS: CPT

## 2024-01-25 PROCEDURE — 97110 THERAPEUTIC EXERCISES: CPT

## 2024-01-25 NOTE — PROGRESS NOTES
hip abd B X 40 X 40 X 40 X 40 X 40   Clams B X 40 X 40 X 40 X 40 X 40   Piriformis stretch B 3 x 15 sec 3 x 15 sec 3 x 15 sec 3 x 15 sec 3 x 15 sec   Seated trunk flex stretch X 3 X 3 X 5 X 5 X 5   Sit to stand  X 30 X 30 X 30 X 30 X 30           HEP: see hand out       Treatment/Session Summary:    Treatment Assessment:  Pt doing well and compliant with home management.  Will see once next week then D/C if no increased pain.  Communication/Consultation:  None today  Equipment provided today:  None  Recommendations/Intent for next treatment session: Next visit will focus on stretching, strengthening, manual techniques as indicated.    Total Treatment Billable Duration: 38 minutes  Time In: 1100  Time Out: 1138    MALA GARCIA, PT       Charge Capture  }Post Session Pain  PT Visit Info  [employer] Portal  MD Guidelines  Scanned Media  Benefits  MyChart    Future Appointments   Date Time Provider Department Center   1/29/2024  9:30 AM Mala Garcia, PT [facility]   2/1/2024  8:45 AM Mala Garcia, PT [facility]   3/27/2024  8:45 AM [facility] MOBILE MAMMO 1 SF[facility]MM [facility]   7/1/2024  8:15 AM TRIM LAB RESOURCE TRIM [facility] AMB   7/15/2024  9:30 AM Rigo Shukla,  TRIM [facility] AMB

## 2024-01-29 ENCOUNTER — HOSPITAL ENCOUNTER (OUTPATIENT)
Dept: PHYSICAL THERAPY | Age: 74
Setting detail: RECURRING SERIES
End: 2024-01-29
Attending: INTERNAL MEDICINE
Payer: MEDICARE

## 2024-02-01 ENCOUNTER — HOSPITAL ENCOUNTER (OUTPATIENT)
Dept: PHYSICAL THERAPY | Age: 74
Setting detail: RECURRING SERIES
Discharge: HOME | End: 2024-02-04
Attending: INTERNAL MEDICINE
Payer: MEDICARE

## 2024-02-01 PROCEDURE — 97110 THERAPEUTIC EXERCISES: CPT

## 2024-02-01 PROCEDURE — 97140 MANUAL THERAPY 1/> REGIONS: CPT

## 2024-02-01 NOTE — DISCHARGE SUMMARY
Josey Black  : 1950  Primary: Medicare Part A And B (Medicare)  Secondary: [facility] [employer] MEDICARE SUPP [facility] @ [place]  [facility] [address]  Phone: [phone]  Fax: [phone] Plan Frequency: 2 times/week    Plan of Care/Certification Expiration Date: 24      PT Visit Info:  Plan Frequency: 2 times/week  Plan of Care/Certification Expiration Date: 24  Total # of Visits to Date: 6         OUTPATIENT PHYSICAL THERAPY: Treatment Note and Discharge Summary 2024       Episode  }Appt Desk             Treatment Diagnosis:    Left hip pain  Stiffness of hip joint, left  Stiffness of hip joint, right  Muscle weakness (generalized)  Other low back pain    Goals: (Goals have been discussed and agreed upon with patient.)  Short-Term Functional Goals: Time Frame: 4 weeks  Pt to report compliance with HEP - MET  Pt to demonstrate fluid movement patterns without cues - ongoing  Discharge Goals: Time Frame: 8 weeks  Pt to increase strength for sit to stand x 30 reps without UE assist, with correct alignment - MET  Pt to normalize hip flex L to eliminate pain with stretching - MET  Pt to report corby to walking without hip pain - MET    Medical/Referring Diagnosis:      Referring Physician:  Rigo Shukla DO MD Orders:  PT Eval and Treat   Date of Onset:  Onset Date: 10/30/23     Allergies:   Patient has no known allergies.  Restrictions/Precautions:  No data recordedNo data recorded   Interventions Planned (Treatment may consist of any combination of the following):    Current Treatment Recommendations: Strengthening; ROM; Manual; Pain management; Home exercise program     Subjective Comments:   I am still doing well.  I feel it when I lay on it but for the most part it is doing good.    Initial: 0/10                            Post Session:  0/10  Medications Last Reviewed:  2024  Updated Objective Findings:  24       Functional

## 2024-03-07 RX ORDER — IRBESARTAN AND HYDROCHLOROTHIAZIDE 300; 12.5 MG/1; MG/1
1 TABLET, FILM COATED ORAL DAILY
Qty: 90 TABLET | Refills: 3 | Status: SHIPPED | OUTPATIENT
Start: 2024-03-07

## 2024-03-27 ENCOUNTER — HOSPITAL ENCOUNTER (OUTPATIENT)
Dept: MAMMOGRAPHY | Age: 74
Discharge: HOME | End: 2024-03-30
Payer: MEDICARE

## 2024-03-27 VITALS — WEIGHT: 135 LBS | BODY MASS INDEX: 26.5 KG/M2 | HEIGHT: 60 IN

## 2024-03-27 DIAGNOSIS — Z12.31 ENCOUNTER FOR MAMMOGRAM TO ESTABLISH BASELINE MAMMOGRAM: ICD-10-CM

## 2024-03-27 PROCEDURE — 77063 BREAST TOMOSYNTHESIS BI: CPT

## 2024-04-09 DIAGNOSIS — M85.80 OSTEOPENIA, UNSPECIFIED LOCATION: ICD-10-CM

## 2024-04-09 DIAGNOSIS — I10 PRIMARY HYPERTENSION: ICD-10-CM

## 2024-04-09 DIAGNOSIS — G25.81 RESTLESS LEG SYNDROME: ICD-10-CM

## 2024-04-09 RX ORDER — OMEPRAZOLE 20 MG/1
20 CAPSULE, DELAYED RELEASE ORAL DAILY
Qty: 90 CAPSULE | Refills: 3 | Status: SHIPPED | OUTPATIENT
Start: 2024-04-09

## 2024-06-20 ENCOUNTER — OFFICE VISIT (OUTPATIENT)
Dept: ORTHOPEDIC SURGERY | Age: 74
End: 2024-06-20
Payer: MEDICARE

## 2024-06-20 DIAGNOSIS — M25.561 RIGHT KNEE PAIN, UNSPECIFIED CHRONICITY: Primary | ICD-10-CM

## 2024-06-20 PROCEDURE — G8428 CUR MEDS NOT DOCUMENT: HCPCS | Performed by: ORTHOPAEDIC SURGERY

## 2024-06-20 PROCEDURE — 1123F ACP DISCUSS/DSCN MKR DOCD: CPT | Performed by: ORTHOPAEDIC SURGERY

## 2024-06-20 PROCEDURE — 1036F TOBACCO NON-USER: CPT | Performed by: ORTHOPAEDIC SURGERY

## 2024-06-20 PROCEDURE — 1090F PRES/ABSN URINE INCON ASSESS: CPT | Performed by: ORTHOPAEDIC SURGERY

## 2024-06-20 PROCEDURE — G8399 PT W/DXA RESULTS DOCUMENT: HCPCS | Performed by: ORTHOPAEDIC SURGERY

## 2024-06-20 PROCEDURE — G8417 CALC BMI ABV UP PARAM F/U: HCPCS | Performed by: ORTHOPAEDIC SURGERY

## 2024-06-20 PROCEDURE — 3017F COLORECTAL CA SCREEN DOC REV: CPT | Performed by: ORTHOPAEDIC SURGERY

## 2024-06-20 PROCEDURE — 99204 OFFICE O/P NEW MOD 45 MIN: CPT | Performed by: ORTHOPAEDIC SURGERY

## 2024-06-20 NOTE — PROGRESS NOTES
Patient ID:  Josey Black  [id number]  74 y.o.  1950    Today: June 20, 2024          Chief Complaint: Right Knee pain    HPI:       Josey Black is a 74 y.o. female seen for evaluation and treatment of their knee pain. Patient reports a longstanding history of pain involving the knee. The patient complains of knee pain with activities, reports pain as mostly occurring along the joint lines, reports stiffness of the knee with prolonged inactivity, and swelling/pain at the end of the day and after increased physical activity. The patient's knee pain cause moderate to severe limitations in the activities of rising from bed, putting on socks/shoes, rising from a sitting position, bending towards the floor and kneeling, twisting and pivoting on the limb and squatting. In addition, at times the patient reports extreme difficulty with these activities. Generally, symptoms improve with sitting/rest. The pain affects the patient’s activities of daily living and quality of life. Patient reports progressive pain and instability in the knee. The pain has been present for an extended period of time. Pain ranges from approximately 4-8 in a cyclical fashion with periods of acute exacerbation.    Prior procedures on the knee include none.    Patient has attempted prior conservative treatment including medications and activity modification.    Treatment to date has included oral medications inclusive of over-the-counter medications (NSAIDS and/or Tylenol) +/-prescription medications, activity modification, weight loss (if applicable), and injections which include corticosteroids and/or viscosupplimentation.    Past Medical History:  Past Medical History:   Diagnosis Date    Hypertension     Insomnia     Osteopenia     Restless leg syndrome        Past Surgical History:  Past Surgical History:   Procedure Laterality Date    CATARACT REMOVAL  01/2019    CHOLECYSTECTOMY      GYN  2005    ovarian cyst    ORTHOPEDIC SURGERY

## 2024-06-21 DIAGNOSIS — M17.11 PRIMARY OSTEOARTHRITIS OF RIGHT KNEE: Primary | ICD-10-CM

## 2024-06-21 DIAGNOSIS — M21.961 ACQUIRED DEFORMITY OF RIGHT KNEE: ICD-10-CM

## 2024-07-01 ENCOUNTER — NURSE ONLY (OUTPATIENT)
Dept: INTERNAL MEDICINE CLINIC | Facility: CLINIC | Age: 74
End: 2024-07-01

## 2024-07-01 DIAGNOSIS — G25.81 RESTLESS LEG SYNDROME: ICD-10-CM

## 2024-07-01 DIAGNOSIS — R25.2 MUSCLE CRAMPS AT NIGHT: ICD-10-CM

## 2024-07-01 DIAGNOSIS — I10 PRIMARY HYPERTENSION: ICD-10-CM

## 2024-07-01 LAB
ALBUMIN SERPL-MCNC: 3.5 G/DL (ref 3.2–4.6)
ALBUMIN/GLOB SERPL: 1.2 (ref 1–1.9)
ALP SERPL-CCNC: 66 U/L (ref 35–104)
ALT SERPL-CCNC: 16 U/L (ref 12–65)
ANION GAP SERPL CALC-SCNC: 9 MMOL/L (ref 9–18)
AST SERPL-CCNC: 19 U/L (ref 15–37)
BASOPHILS # BLD: 0.1 K/UL (ref 0–0.2)
BASOPHILS NFR BLD: 1 % (ref 0–2)
BILIRUB SERPL-MCNC: 0.4 MG/DL (ref 0–1.2)
BUN SERPL-MCNC: 17 MG/DL (ref 8–23)
CALCIUM SERPL-MCNC: 9.2 MG/DL (ref 8.8–10.2)
CHLORIDE SERPL-SCNC: 104 MMOL/L (ref 98–107)
CO2 SERPL-SCNC: 27 MMOL/L (ref 20–28)
CREAT SERPL-MCNC: 0.89 MG/DL (ref 0.6–1.1)
DIFFERENTIAL METHOD BLD: ABNORMAL
EOSINOPHIL # BLD: 0.2 K/UL (ref 0–0.8)
EOSINOPHIL NFR BLD: 3 % (ref 0.5–7.8)
ERYTHROCYTE [DISTWIDTH] IN BLOOD BY AUTOMATED COUNT: 12.8 % (ref 11.9–14.6)
GLOBULIN SER CALC-MCNC: 3 G/DL (ref 2.3–3.5)
GLUCOSE SERPL-MCNC: 99 MG/DL (ref 70–99)
HCT VFR BLD AUTO: 39.9 % (ref 35.8–46.3)
HGB BLD-MCNC: 12.9 G/DL (ref 11.7–15.4)
IMM GRANULOCYTES # BLD AUTO: 0 K/UL (ref 0–0.5)
IMM GRANULOCYTES NFR BLD AUTO: 0 % (ref 0–5)
LYMPHOCYTES # BLD: 1.8 K/UL (ref 0.5–4.6)
LYMPHOCYTES NFR BLD: 35 % (ref 13–44)
MAGNESIUM SERPL-MCNC: 2 MG/DL (ref 1.8–2.4)
MCH RBC QN AUTO: 30.4 PG (ref 26.1–32.9)
MCHC RBC AUTO-ENTMCNC: 32.3 G/DL (ref 31.4–35)
MCV RBC AUTO: 93.9 FL (ref 82–102)
MONOCYTES # BLD: 0.7 K/UL (ref 0.1–1.3)
MONOCYTES NFR BLD: 13 % (ref 4–12)
NEUTS SEG # BLD: 2.4 K/UL (ref 1.7–8.2)
NEUTS SEG NFR BLD: 48 % (ref 43–78)
NRBC # BLD: 0 K/UL (ref 0–0.2)
PLATELET # BLD AUTO: 247 K/UL (ref 150–450)
PMV BLD AUTO: 10.1 FL (ref 9.4–12.3)
POTASSIUM SERPL-SCNC: 3.9 MMOL/L (ref 3.5–5.1)
PROT SERPL-MCNC: 6.5 G/DL (ref 6.3–8.2)
RBC # BLD AUTO: 4.25 M/UL (ref 4.05–5.2)
SODIUM SERPL-SCNC: 141 MMOL/L (ref 136–145)
TSH, 3RD GENERATION: 3.28 UIU/ML (ref 0.27–4.2)
WBC # BLD AUTO: 5 K/UL (ref 4.3–11.1)

## 2024-07-22 RX ORDER — TRAZODONE HYDROCHLORIDE 50 MG/1
TABLET ORAL
Qty: 90 TABLET | Refills: 1 | Status: SHIPPED | OUTPATIENT
Start: 2024-07-22

## 2024-07-23 ASSESSMENT — PATIENT HEALTH QUESTIONNAIRE - PHQ9
1. LITTLE INTEREST OR PLEASURE IN DOING THINGS: NOT AT ALL
SUM OF ALL RESPONSES TO PHQ9 QUESTIONS 1 & 2: 0
SUM OF ALL RESPONSES TO PHQ9 QUESTIONS 1 & 2: 0
2. FEELING DOWN, DEPRESSED OR HOPELESS: NOT AT ALL
SUM OF ALL RESPONSES TO PHQ QUESTIONS 1-9: 0
1. LITTLE INTEREST OR PLEASURE IN DOING THINGS: NOT AT ALL
2. FEELING DOWN, DEPRESSED OR HOPELESS: NOT AT ALL

## 2024-07-26 SDOH — ECONOMIC STABILITY: INCOME INSECURITY: HOW HARD IS IT FOR YOU TO PAY FOR THE VERY BASICS LIKE FOOD, HOUSING, MEDICAL CARE, AND HEATING?: NOT HARD AT ALL

## 2024-07-26 SDOH — ECONOMIC STABILITY: FOOD INSECURITY: WITHIN THE PAST 12 MONTHS, YOU WORRIED THAT YOUR FOOD WOULD RUN OUT BEFORE YOU GOT MONEY TO BUY MORE.: NEVER TRUE

## 2024-07-26 SDOH — ECONOMIC STABILITY: FOOD INSECURITY: WITHIN THE PAST 12 MONTHS, THE FOOD YOU BOUGHT JUST DIDN'T LAST AND YOU DIDN'T HAVE MONEY TO GET MORE.: NEVER TRUE

## 2024-07-29 ENCOUNTER — OFFICE VISIT (OUTPATIENT)
Dept: INTERNAL MEDICINE CLINIC | Facility: CLINIC | Age: 74
End: 2024-07-29
Payer: MEDICARE

## 2024-07-29 VITALS
WEIGHT: 135 LBS | OXYGEN SATURATION: 99 % | TEMPERATURE: 97.9 F | HEART RATE: 67 BPM | DIASTOLIC BLOOD PRESSURE: 60 MMHG | SYSTOLIC BLOOD PRESSURE: 128 MMHG | BODY MASS INDEX: 26.5 KG/M2 | HEIGHT: 60 IN

## 2024-07-29 DIAGNOSIS — F51.04 PSYCHOPHYSIOLOGICAL INSOMNIA: ICD-10-CM

## 2024-07-29 DIAGNOSIS — M17.11 PRIMARY OSTEOARTHRITIS OF RIGHT KNEE: ICD-10-CM

## 2024-07-29 DIAGNOSIS — M85.80 OSTEOPENIA, UNSPECIFIED LOCATION: ICD-10-CM

## 2024-07-29 DIAGNOSIS — M85.89 OTHER SPECIFIED DISORDERS OF BONE DENSITY AND STRUCTURE, MULTIPLE SITES: ICD-10-CM

## 2024-07-29 DIAGNOSIS — I10 PRIMARY HYPERTENSION: Primary | ICD-10-CM

## 2024-07-29 PROCEDURE — 1036F TOBACCO NON-USER: CPT | Performed by: INTERNAL MEDICINE

## 2024-07-29 PROCEDURE — 99214 OFFICE O/P EST MOD 30 MIN: CPT | Performed by: INTERNAL MEDICINE

## 2024-07-29 PROCEDURE — 1090F PRES/ABSN URINE INCON ASSESS: CPT | Performed by: INTERNAL MEDICINE

## 2024-07-29 PROCEDURE — G8417 CALC BMI ABV UP PARAM F/U: HCPCS | Performed by: INTERNAL MEDICINE

## 2024-07-29 PROCEDURE — 3074F SYST BP LT 130 MM HG: CPT | Performed by: INTERNAL MEDICINE

## 2024-07-29 PROCEDURE — 3017F COLORECTAL CA SCREEN DOC REV: CPT | Performed by: INTERNAL MEDICINE

## 2024-07-29 PROCEDURE — 1123F ACP DISCUSS/DSCN MKR DOCD: CPT | Performed by: INTERNAL MEDICINE

## 2024-07-29 PROCEDURE — G8427 DOCREV CUR MEDS BY ELIG CLIN: HCPCS | Performed by: INTERNAL MEDICINE

## 2024-07-29 PROCEDURE — G8399 PT W/DXA RESULTS DOCUMENT: HCPCS | Performed by: INTERNAL MEDICINE

## 2024-07-29 PROCEDURE — 3078F DIAST BP <80 MM HG: CPT | Performed by: INTERNAL MEDICINE

## 2024-07-29 ASSESSMENT — ENCOUNTER SYMPTOMS: BACK PAIN: 0

## 2024-07-29 NOTE — PROGRESS NOTES
Josey was seen today for follow-up, other and knee pain.    Diagnoses and all orders for this visit:    Primary hypertension  -     Vitamin D 25 Hydroxy; Future  -     Comprehensive Metabolic Panel; Future  -     CBC with Auto Differential; Future  -     Lipid Panel; Future  -     TSH with Reflex; Future    Primary osteoarthritis of right knee  -     Vitamin D 25 Hydroxy; Future  -     Comprehensive Metabolic Panel; Future  -     CBC with Auto Differential; Future  -     Lipid Panel; Future  -     TSH with Reflex; Future    Psychophysiological insomnia  -     Vitamin D 25 Hydroxy; Future  -     Comprehensive Metabolic Panel; Future  -     CBC with Auto Differential; Future  -     Lipid Panel; Future  -     TSH with Reflex; Future    Osteopenia, unspecified location  -     Vitamin D 25 Hydroxy; Future  -     Comprehensive Metabolic Panel; Future  -     CBC with Auto Differential; Future  -     Lipid Panel; Future  -     TSH with Reflex; Future    Other specified disorders of bone density and structure, multiple sites  -     Vitamin D 25 Hydroxy; Future          Josey Black is a 74 y.o. female    Chief Complaint   Patient presents with    Follow-up     Yearly check up and review labs    OTHER     Syncope spell after giving blood    Knee Pain     Right knee p[ain having TKN       Labs  Doing well    Golfing only nine holes now    Nurse Only on 07/01/2024   Component Date Value Ref Range Status    WBC 07/01/2024 5.0  4.3 - 11.1 K/uL Final    RBC 07/01/2024 4.25  4.05 - 5.2 M/uL Final    Hemoglobin 07/01/2024 12.9  11.7 - 15.4 g/dL Final    Hematocrit 07/01/2024 39.9  35.8 - 46.3 % Final    MCV 07/01/2024 93.9  82 - 102 FL Final    MCH 07/01/2024 30.4  26.1 - 32.9 PG Final    MCHC 07/01/2024 32.3  31.4 - 35.0 g/dL Final    RDW 07/01/2024 12.8  11.9 - 14.6 % Final    Platelets 07/01/2024 247  150 - 450 K/uL Final    MPV 07/01/2024 10.1  9.4 - 12.3 FL Final    nRBC 07/01/2024 0.00  0.0 - 0.2 K/uL Final    **Note:

## 2024-08-01 ENCOUNTER — PREP FOR PROCEDURE (OUTPATIENT)
Dept: ORTHOPEDIC SURGERY | Age: 74
End: 2024-08-01

## 2024-08-01 DIAGNOSIS — Z01.818 PRE-OP EVALUATION: Primary | ICD-10-CM

## 2024-08-01 RX ORDER — SODIUM CHLORIDE 9 MG/ML
INJECTION, SOLUTION INTRAVENOUS PRN
Status: CANCELLED | OUTPATIENT
Start: 2024-08-01

## 2024-08-01 RX ORDER — SODIUM CHLORIDE 0.9 % (FLUSH) 0.9 %
5-40 SYRINGE (ML) INJECTION EVERY 12 HOURS SCHEDULED
Status: CANCELLED | OUTPATIENT
Start: 2024-08-01

## 2024-08-01 RX ORDER — SODIUM CHLORIDE 0.9 % (FLUSH) 0.9 %
5-40 SYRINGE (ML) INJECTION PRN
Status: CANCELLED | OUTPATIENT
Start: 2024-08-01

## 2024-08-05 DIAGNOSIS — G89.18 POSTOPERATIVE PAIN: Primary | ICD-10-CM

## 2024-08-05 RX ORDER — MELOXICAM 15 MG/1
15 TABLET ORAL DAILY
Qty: 30 TABLET | Refills: 2 | Status: SHIPPED | OUTPATIENT
Start: 2024-08-05

## 2024-08-05 RX ORDER — ONDANSETRON 4 MG/1
4 TABLET, FILM COATED ORAL 3 TIMES DAILY PRN
Qty: 30 TABLET | Refills: 1 | Status: SHIPPED | OUTPATIENT
Start: 2024-08-05

## 2024-08-05 RX ORDER — OXYCODONE HYDROCHLORIDE 5 MG/1
10 TABLET ORAL EVERY 4 HOURS PRN
Qty: 60 TABLET | Refills: 0 | Status: SHIPPED | OUTPATIENT
Start: 2024-08-05 | End: 2024-08-12

## 2024-08-05 RX ORDER — ACETAMINOPHEN 500 MG
1000 TABLET ORAL EVERY 6 HOURS PRN
Qty: 180 TABLET | Refills: 2 | Status: SHIPPED | OUTPATIENT
Start: 2024-08-05

## 2024-08-05 RX ORDER — ASPIRIN 81 MG/1
81 TABLET ORAL 2 TIMES DAILY
Qty: 60 TABLET | Refills: 0 | Status: SHIPPED | OUTPATIENT
Start: 2024-08-05

## 2024-08-05 RX ORDER — OMEPRAZOLE 40 MG/1
40 CAPSULE, DELAYED RELEASE ORAL DAILY
Qty: 30 CAPSULE | Refills: 0 | Status: SHIPPED | OUTPATIENT
Start: 2024-08-05

## 2024-08-05 RX ORDER — SENNOSIDES A AND B 8.6 MG/1
1 TABLET, FILM COATED ORAL 2 TIMES DAILY
Qty: 30 TABLET | Refills: 2 | Status: SHIPPED | OUTPATIENT
Start: 2024-08-05

## 2024-08-05 RX ORDER — TIZANIDINE 4 MG/1
4 TABLET ORAL EVERY 8 HOURS PRN
Qty: 40 TABLET | Refills: 0 | Status: SHIPPED | OUTPATIENT
Start: 2024-08-05

## 2024-08-05 RX ORDER — GABAPENTIN 100 MG/1
100 CAPSULE ORAL 2 TIMES DAILY
Qty: 30 CAPSULE | Refills: 0 | Status: SHIPPED | OUTPATIENT
Start: 2024-08-05 | End: 2024-08-20

## 2024-08-06 ENCOUNTER — HOSPITAL ENCOUNTER (OUTPATIENT)
Dept: SURGERY | Age: 74
Discharge: HOME | End: 2024-08-09
Payer: MEDICARE

## 2024-08-06 ENCOUNTER — HOSPITAL ENCOUNTER (OUTPATIENT)
Dept: REHABILITATION | Age: 74
Discharge: HOME | End: 2024-08-09
Payer: MEDICARE

## 2024-08-06 VITALS
HEIGHT: 60 IN | SYSTOLIC BLOOD PRESSURE: 144 MMHG | BODY MASS INDEX: 26.31 KG/M2 | HEART RATE: 88 BPM | TEMPERATURE: 97.9 F | OXYGEN SATURATION: 98 % | DIASTOLIC BLOOD PRESSURE: 59 MMHG | WEIGHT: 134 LBS

## 2024-08-06 DIAGNOSIS — R06.83 SNORING: Primary | ICD-10-CM

## 2024-08-06 DIAGNOSIS — Z01.818 PRE-OP EVALUATION: ICD-10-CM

## 2024-08-06 LAB
ALBUMIN SERPL-MCNC: 3.2 G/DL (ref 3.2–4.6)
ALBUMIN/GLOB SERPL: 1 (ref 1–1.9)
ALP SERPL-CCNC: 58 U/L (ref 35–104)
ALT SERPL-CCNC: 19 U/L (ref 12–65)
ANION GAP SERPL CALC-SCNC: 13 MMOL/L (ref 9–18)
AST SERPL-CCNC: 17 U/L (ref 15–37)
BASOPHILS # BLD: 0.1 K/UL (ref 0–0.2)
BASOPHILS NFR BLD: 1 % (ref 0–2)
BILIRUB SERPL-MCNC: 0.4 MG/DL (ref 0–1.2)
BUN SERPL-MCNC: 18 MG/DL (ref 8–23)
CALCIUM SERPL-MCNC: 9.1 MG/DL (ref 8.8–10.2)
CHLORIDE SERPL-SCNC: 103 MMOL/L (ref 98–107)
CO2 SERPL-SCNC: 25 MMOL/L (ref 20–28)
CREAT SERPL-MCNC: 0.91 MG/DL (ref 0.6–1.1)
DIFFERENTIAL METHOD BLD: NORMAL
EKG ATRIAL RATE: 75 BPM
EKG DIAGNOSIS: NORMAL
EKG P AXIS: 48 DEGREES
EKG P-R INTERVAL: 168 MS
EKG Q-T INTERVAL: 382 MS
EKG QRS DURATION: 96 MS
EKG QTC CALCULATION (BAZETT): 426 MS
EKG R AXIS: 57 DEGREES
EKG T AXIS: 30 DEGREES
EKG VENTRICULAR RATE: 75 BPM
EOSINOPHIL # BLD: 0.1 K/UL (ref 0–0.8)
EOSINOPHIL NFR BLD: 2 % (ref 0.5–7.8)
ERYTHROCYTE [DISTWIDTH] IN BLOOD BY AUTOMATED COUNT: 12.2 % (ref 11.9–14.6)
EST. AVERAGE GLUCOSE BLD GHB EST-MCNC: 111 MG/DL
GLOBULIN SER CALC-MCNC: 3.2 G/DL (ref 2.3–3.5)
GLUCOSE SERPL-MCNC: 115 MG/DL (ref 70–99)
HBA1C MFR BLD: 5.5 % (ref 0–5.6)
HCT VFR BLD AUTO: 36.5 % (ref 35.8–46.3)
HGB BLD-MCNC: 12.1 G/DL (ref 11.7–15.4)
IMM GRANULOCYTES # BLD AUTO: 0 K/UL (ref 0–0.5)
IMM GRANULOCYTES NFR BLD AUTO: 0 % (ref 0–5)
INR PPP: 1.1
LYMPHOCYTES # BLD: 1.3 K/UL (ref 0.5–4.6)
LYMPHOCYTES NFR BLD: 29 % (ref 13–44)
MCH RBC QN AUTO: 29.3 PG (ref 26.1–32.9)
MCHC RBC AUTO-ENTMCNC: 33.2 G/DL (ref 31.4–35)
MCV RBC AUTO: 88.4 FL (ref 82–102)
MONOCYTES # BLD: 0.5 K/UL (ref 0.1–1.3)
MONOCYTES NFR BLD: 11 % (ref 4–12)
MRSA DNA SPEC QL NAA+PROBE: NOT DETECTED
NEUTS SEG # BLD: 2.6 K/UL (ref 1.7–8.2)
NEUTS SEG NFR BLD: 58 % (ref 43–78)
NRBC # BLD: 0 K/UL (ref 0–0.2)
PLATELET # BLD AUTO: 264 K/UL (ref 150–450)
PMV BLD AUTO: 9.7 FL (ref 9.4–12.3)
POTASSIUM SERPL-SCNC: 3.4 MMOL/L (ref 3.5–5.1)
PROT SERPL-MCNC: 6.4 G/DL (ref 6.3–8.2)
PROTHROMBIN TIME: 13.6 SEC (ref 11.3–14.9)
RBC # BLD AUTO: 4.13 M/UL (ref 4.05–5.2)
S AUREUS CPE NOSE QL NAA+PROBE: DETECTED
SODIUM SERPL-SCNC: 141 MMOL/L (ref 136–145)
WBC # BLD AUTO: 4.5 K/UL (ref 4.3–11.1)

## 2024-08-06 PROCEDURE — 93005 ELECTROCARDIOGRAM TRACING: CPT

## 2024-08-06 PROCEDURE — 80307 DRUG TEST PRSMV CHEM ANLYZR: CPT

## 2024-08-06 PROCEDURE — 87641 MR-STAPH DNA AMP PROBE: CPT

## 2024-08-06 PROCEDURE — 85610 PROTHROMBIN TIME: CPT

## 2024-08-06 PROCEDURE — 94760 N-INVAS EAR/PLS OXIMETRY 1: CPT

## 2024-08-06 PROCEDURE — 85025 COMPLETE CBC W/AUTO DIFF WBC: CPT

## 2024-08-06 PROCEDURE — 97161 PT EVAL LOW COMPLEX 20 MIN: CPT

## 2024-08-06 PROCEDURE — 80053 COMPREHEN METABOLIC PANEL: CPT

## 2024-08-06 PROCEDURE — 83036 HEMOGLOBIN GLYCOSYLATED A1C: CPT

## 2024-08-06 PROCEDURE — 93010 ELECTROCARDIOGRAM REPORT: CPT | Performed by: INTERNAL MEDICINE

## 2024-08-06 ASSESSMENT — PROMIS GLOBAL HEALTH SCALE
IN THE PAST 7 DAYS, HOW WOULD YOU RATE YOUR PAIN ON AVERAGE [ON A SCALE FROM 0 (NO PAIN) TO 10 (WORST IMAGINABLE PAIN)]?: 5
IN THE PAST 7 DAYS, HOW OFTEN HAVE YOU BEEN BOTHERED BY EMOTIONAL PROBLEMS, SUCH AS FEELING ANXIOUS, DEPRESSED, OR IRRITABLE [ON A SCALE FROM 1 (NEVER) TO 5 (ALWAYS)]?: NEVER
IN GENERAL, HOW WOULD YOU RATE YOUR MENTAL HEALTH, INCLUDING YOUR MOOD AND YOUR ABILITY TO THINK [ON A SCALE OF 1 (POOR) TO 5 (EXCELLENT)]?: EXCELLENT
TO WHAT EXTENT ARE YOU ABLE TO CARRY OUT YOUR EVERYDAY PHYSICAL ACTIVITIES SUCH AS WALKING, CLIMBING STAIRS, CARRYING GROCERIES, OR MOVING A CHAIR [ON A SCALE OF 1 (NOT AT ALL) TO 5 (COMPLETELY)]?: MODERATELY
IN GENERAL, HOW WOULD YOU RATE YOUR PHYSICAL HEALTH [ON A SCALE OF 1 (POOR) TO 5 (EXCELLENT)]?: VERY GOOD
IN GENERAL, WOULD YOU SAY YOUR HEALTH IS...[ON A SCALE OF 1 (POOR) TO 5 (EXCELLENT)]: VERY GOOD
WHO IS THE PERSON COMPLETING THE PROMIS V1.1 SURVEY?: SELF
SUM OF RESPONSES TO QUESTIONS 3, 6, 7, & 8: 16
HOW IS THE PROMIS V1.1 BEING ADMINISTERED?: PAPER
IN GENERAL, PLEASE RATE HOW WELL YOU CARRY OUT YOUR USUAL SOCIAL ACTIVITIES (INCLUDES ACTIVITIES AT HOME, AT WORK, AND IN YOUR COMMUNITY, AND RESPONSIBILITIES AS A PARENT, CHILD, SPOUSE, EMPLOYEE, FRIEND, ETC) [ON A SCALE OF 1 (POOR) TO 5 (EXCELLENT)]?: VERY GOOD
IN THE PAST 7 DAYS, HOW WOULD YOU RATE YOUR FATIGUE ON AVERAGE [ON A SCALE FROM 1 (NONE) TO 5 (VERY SEVERE)]?: MILD
IN GENERAL, HOW WOULD YOU RATE YOUR SATISFACTION WITH YOUR SOCIAL ACTIVITIES AND RELATIONSHIPS [ON A SCALE OF 1 (POOR) TO 5 (EXCELLENT)]?: EXCELLENT
IN GENERAL, WOULD YOU SAY YOUR QUALITY OF LIFE IS...[ON A SCALE OF 1 (POOR) TO 5 (EXCELLENT)]: EXCELLENT
SUM OF RESPONSES TO QUESTIONS 2, 4, 5, & 10: 20

## 2024-08-06 ASSESSMENT — PAIN DESCRIPTION - LOCATION: LOCATION: KNEE

## 2024-08-06 ASSESSMENT — PULMONARY FUNCTION TESTS
FEV1 (LITERS): 1.82
FEV1 (%PREDICTED): 117

## 2024-08-06 ASSESSMENT — KOOS JR
BENDING TO THE FLOOR TO PICK UP OBJECT: MODERATE
STANDING UPRIGHT: MODERATE
HOW SEVERE IS YOUR KNEE STIFFNESS AFTER FIRST WAKING IN MORNING: SEVERE
STRAIGHTENING KNEE FULLY: MODERATE
KOOS JR TOTAL INTERVAL SCORE: 50.012
GOING UP OR DOWN STAIRS: MODERATE
TWISING OR PIVOTING ON KNEE: MODERATE
RISING FROM SITTING: MODERATE

## 2024-08-06 ASSESSMENT — PAIN DESCRIPTION - ORIENTATION: ORIENTATION: RIGHT

## 2024-08-06 ASSESSMENT — PAIN SCALES - GENERAL: PAINLEVEL_OUTOF10: 5

## 2024-08-06 NOTE — PERIOP NOTE
Patient verified name and .    Order for consent IS found in EHR and matches case posting; patient verified.     Type 3 surgery, joint camp assessment complete.    Labs per surgeon: CBC,BMP, PT/INR, A1C, albumin, nicotine, MRSA swab ; results processing.  Labs per anesthesia protocol: no additional  EKG: done today, results within anesthesia protocols.    MRSA/MSSA swab collected per policy. MD to consult pharmacy to dose Vanc if appropriate.     Hospital approved surgical skin cleanser and instructions to return bottle on DOS given per hospital policy.    Patient provided with handouts including Guide to Surgery, Pain Management, Preventing Surgical Site Infections, and [facility] Brochure.    Patient answered medical/surgical history questions at their best of ability. All prior to admission medications documented in [facility]. Original medication prescription bottles were visualized during patient appointment.     Patient instructed to hold all vitamins 3 weeks prior to surgery and NSAIDS 5 days prior to surgery.     Patient teach back successful and patient demonstrates knowledge of instruction.

## 2024-08-06 NOTE — PERIOP NOTE
Lab results within anesthesia guidelines, no follow-up required. Labs automatically routed to ordering provider via Epic documentation.      MRSA swab, nicotine, and A1c still processing.       Latest Reference Range & Units 08/06/24 12:01 08/06/24 12:05   Sodium 136 - 145 mmol/L 141    Potassium 3.5 - 5.1 mmol/L 3.4 (L)    Chloride 98 - 107 mmol/L 103    CARBON DIOXIDE 20 - 28 mmol/L 25    BUN,BUNPL 8 - 23 MG/DL 18    Creatinine 0.60 - 1.10 MG/DL 0.91    Anion Gap 9 - 18 mmol/L 13    Est, Glom Filt Rate >60 ml/min/1.73m2 66    Glucose 70 - 99 mg/dL 115 (H)    Calcium 8.8 - 10.2 MG/DL 9.1    Albumin/Globulin Ratio 1.0 - 1.9   1.0    Total Protein 6.3 - 8.2 g/dL 6.4    Albumin 3.2 - 4.6 g/dL 3.2    Globulin 2.3 - 3.5 g/dL 3.2    Alkaline Phosphatase 35 - 104 U/L 58    ALT 12 - 65 U/L 19    AST 15 - 37 U/L 17    Total Bilirubin 0.0 - 1.2 MG/DL 0.4    WBC 4.3 - 11.1 K/uL 4.5    RBC 4.05 - 5.2 M/uL 4.13    Hemoglobin Quant 11.7 - 15.4 g/dL 12.1    Hematocrit 35.8 - 46.3 % 36.5    MCV 82.0 - 102.0 FL 88.4    MCH 26.1 - 32.9 PG 29.3    MCHC 31.4 - 35.0 g/dL 33.2    MPV 9.4 - 12.3 FL 9.7    RDW 11.9 - 14.6 % 12.2    Platelet Count 150 - 450 K/uL 264    Neutrophils % 43 - 78 % 58    Lymphocyte % 13 - 44 % 29    Monocytes % 4.0 - 12.0 % 11    Eosinophils % 0.5 - 7.8 % 2    Basophils % 0.0 - 2.0 % 1    Neutrophils Absolute 1.7 - 8.2 K/UL 2.6    Lymphocytes Absolute 0.5 - 4.6 K/UL 1.3    Monocytes Absolute 0.1 - 1.3 K/UL 0.5    Eosinophils Absolute 0.0 - 0.8 K/UL 0.1    Basophils Absolute 0.0 - 0.2 K/UL 0.1    Differential Type -   AUTOMATED    Immature Granulocytes % 0.0 - 5.0 % 0    Nucleated Red Blood Cells 0.0 - 0.2 K/uL 0.00    Immature Granulocytes Absolute 0.0 - 0.5 K/UL 0.0    Prothrombin Time 11.3 - 14.9 sec 13.6    INR -   1.1    EKG 12-LEAD   Rpt   Atrial Rate BPM  75 (P)   Diagnosis   Normal sinus rhythm  Normal ECG  No previous ECGs available   (P)   P Axis degrees  48 (P)   P-R Interval ms  168 (P)   Q-T Interval

## 2024-08-06 NOTE — PROGRESS NOTES
from general anesthesia, and Restless leg syndrome.  Ms. Black  has a past surgical history that includes Cataract removal (01/2019); Cholecystectomy; gyn (2005); and orthopedic surgery (2003).    Allergies:  Patient has no known allergies.    Current Medications:  Refer to pre-assessment nursing note    Home Set-Up/Prior Level of Function:  Lives With: Spouse  Home Layout: One level  Home Access: Stairs to enter without rails  Entrance Stairs - Number of Steps: 2  Bathroom Shower/Tub: Walk-in shower  Bathroom Toilet: Bedside commode  Home Equipment: Cane, Walker - Rolling    Dominant Side:  Dominant Hand: : Right      Current Functional Status:   Ambulation:  [x] Independent  [] Walk Indoors Only  [] Walk Outdoors  [] Use Assistive Device  [] Use Wheelchair Only Dressing:  [x] Independent  Requires Assistance from Someone for:  [] Sock/Shoes  [] Pants  [] Everything   Bathing/Showering:   [x] Independent  [] Requires Assistance from Someone  [] Sponge Bath Only Household Activities:  [x] Routine house and yard work  [] Light Housework Only  [] None     Objective:   PAIN:   Pre-Treatment Pain  Pain Assessment: 0-10  Pain Level: 5  Pain Location: Knee  Pain Orientation: Right    Post Treatment: 5    Outcome Measure:   HOOS-JR:          No data to display                 KOOS-JR:  KOJr. SAUNDRA Knee Survey Score: 15  KOOSJR. KNEE SURVEY How severe is your knee stiffness after first wakening in the morning? Twisting/pivoting on your knee Straightening knee fully Going up or down stairs Standing upright Rising from sitting Bending to floor/ an object Jr ANTIONE. Knee Survey Score   8/6/2024   1:56 PM 3 2 2 2 2 2 2 15   7/26/2024   8:55 AM 2 2 1 2 1 2 1 11        LOWER EXTREMITY GROSS EVALUATION:  RIGHT LE   Within Functional Limits   Abnormal   Comments   Strength [x] []     Range of Motion [] []  8-98 degrees@knee      LEFT LE Within Functional Limits   Abnormal   Comments   Strength [x] []     Range of Motion [x]

## 2024-08-06 NOTE — PERIOP NOTE
PLEASE CONTINUE TAKING ALL PRESCRIPTION MEDICATIONS UP TO THE DAY OF SURGERY UNLESS OTHERWISE DIRECTED BELOW.    DISCONTINUE all vitamins, herbals and supplements 3 weeks prior to surgery. DISCONTINUE Non-Steroidal Anti-Inflammatory (NSAIDS) such as Advil, Ibuprofen, Motrin, Naproxen and Aleve 5 days prior to surgery.       Home Medications to take  the day of surgery    Omeprazole, Raloxifene           Home Medications to Hold- please continue all other medications except these.    HOLD all NSAIDS for 5 days before surgery: Advil        Comments   On the day before surgery please take Acetaminophen 1000mg in the morning and then again before bed. You may substitute for Tylenol 650 mg.      Bring Dynahex wash and Incentive Spirometer with you to hospital on the day of surgery.            Please do not bring home medications with you on the day of surgery unless otherwise directed by your nurse.  If you are instructed to bring home medications, please give them to your nurse as they will be administered by the nursing staff.    If you have any questions, please call [facility] [phone].    A copy of this note was provided to the patient for reference.

## 2024-08-07 LAB
COMMENT:: NORMAL
COTININE UR QL SCN: NEGATIVE NG/ML

## 2024-08-07 NOTE — PROGRESS NOTES
08/06/24 1300   Treatment   Treatment Type Bedside spirometry   Breath Sounds   Breath Sounds Bilateral Clear   Oxygen Therapy/Pulse Ox   O2 Therapy Room air   Pulse 81   SpO2 98 %   Pulse Oximeter Device Mode Intermittent   $Pulse Oximeter $Spot check (single)   Bedside Spirometry   FEV-1/Actual (Liters) 1.82 Liters   FEV-1/Predicted (Liters) 117 Liters     Initial respiratory Assessment completed with pt. Pt was interviewed and evaluated in Joint camp prior to surgery.  Patient ID:  Josey Black  [id number]  74 y.o.  1950  Surgeon: Dr. Cooper  Date of Surgery: [unfilled]8/14/2024  Procedure: Total Right Knee Arthroplasty  Primary Care Physician: Rigo Shukla,  [phone]  Specialists:    Pt taught proper COUGH technique  IS REVIEWED WITH PT AS WELL AS BENEFITS OF USING IS IN SEDENTARY PTS.  DIAPHRAGMATIC BREATHING EXERCISE INSTRUCTIONS GIVEN    History of smoking:   DENIES                 Quit date:         Secondhand smoke:FATHER    Past procedures with Oxygen desaturation or delayed awakening:DELAYED AWAKENING 2003 AFTER BACK SURGERY     Respiratory history:DENIES SOB                                                                  Respiratory meds:  DENIES    FAMILY PRESENT:             NO     PAST SLEEP STUDY:                   DENIES  HX OF DICK:                                           DENIES  DICK assessment:     DANGERS OF UNTREATED DICK EXPLAINED TO PT.                                          SLEEPS ON SIDE         PHYSICAL EXAM   Body mass index is 26.17 kg/m².   Vitals:    08/06/24 1317   BP: (!) 144/59   Pulse: 88   Temp: 97.9 °F (36.6 °C)   SpO2: 98%     Neck circumference:   35   cm    Loud snoring:                                                 YES            Witnessed apnea or wakening gasping or choking:        DENIES       Awakens with headaches:                                               DENIES  Morning or daytime tiredness/ sleepiness:                           TIRED  Dry 
   RDW 12.2 11.9 - 14.6 %    Platelets 264 150 - 450 K/uL    MPV 9.7 9.4 - 12.3 FL    nRBC 0.00 0.0 - 0.2 K/uL    Differential Type AUTOMATED      Neutrophils % 58 43 - 78 %    Lymphocytes % 29 13 - 44 %    Monocytes % 11 4.0 - 12.0 %    Eosinophils % 2 0.5 - 7.8 %    Basophils % 1 0.0 - 2.0 %    Immature Granulocytes % 0 0.0 - 5.0 %    Neutrophils Absolute 2.6 1.7 - 8.2 K/UL    Lymphocytes Absolute 1.3 0.5 - 4.6 K/UL    Monocytes Absolute 0.5 0.1 - 1.3 K/UL    Eosinophils Absolute 0.1 0.0 - 0.8 K/UL    Basophils Absolute 0.1 0.0 - 0.2 K/UL    Immature Granulocytes Absolute 0.0 0.0 - 0.5 K/UL   EKG 12 lead    Collection Time: 08/06/24 12:05 PM   Result Value Ref Range    Ventricular Rate 75 BPM    Atrial Rate 75 BPM    P-R Interval 168 ms    QRS Duration 96 ms    Q-T Interval 382 ms    QTc Calculation (Bazett) 426 ms    P Axis 48 degrees    R Axis 57 degrees    T Axis 30 degrees    Diagnosis       Normal sinus rhythm  Normal ECG  No previous ECGs available  Confirmed by KESHIA CISNEROS (), PRISCILLA SULLIVAN ([id number]) on 8/6/2024 3:28:13 PM           Problem List:  )  Patient Active Problem List   Diagnosis    Insomnia    Osteopenia    Restless leg syndrome    Primary osteoarthritis of right knee    Acquired deformity of right knee       Total Joint Surgery Pre-Assessment Recommendations:           Patient would like to participate in SDD.   Patient reports the symptoms of snoring, fatigue, observed apnea and /or excessive daytime sleepiness.   Will refer patient for HST based on above assessment.   Recommend continuous saturation monitoring hours of sleep, during hospitalization.      Signed By: Stella Kessler APRN - CNP-C    August 6, 2024

## 2024-08-08 NOTE — PROGRESS NOTES
NICOTINE AND METABOLITES, URINE  Order: [id number]  Status: Final result       Visible to patient: Yes (not seen)       Next appt: 08/09/2024 at 09:25 AM in Orthopedic Surgery (Byron Cooper MD)       Dx: Pre-op evaluation    0 Result Notes       Component  Ref Range & Units 8/6/24 1201 Resulting Agency   Cotinine Screen, Ur  Dovecu=563 ng/mL Negative [facility]   Comment:    Comment [facility]   Comment: (NOTE)  This analysis is performed by immunoassay. Positive findings are  unconfirmed analytical test results; if results do not support  expected clinical finding, confirmation by an alternate methodology  is recommended. Patient metabolic variables, specific drug chemistry,  and specimen characteristics can affect test outcome.  Technical consultation is available at [email or web address], or  call toll free [phone].  Performed At: [facility]  [address], [place] [id number]  Hansel Blanco MD Ph:[phone]  Performed At: [facility]  [address], [place] [id number]  Christoph Chen PhD Ph:[phone]              Specimen Collected: 08/06/24 12:01 EDT Last Resulted: 08/07/24 16:37 EDT

## 2024-08-08 NOTE — PROGRESS NOTES
Hemoglobin A1c  Order: [id number]  Status: Final result       Visible to patient: Yes (seen)       Next appt: 08/09/2024 at 09:25 AM in Orthopedic Surgery (Byron Cooper MD)       Dx: Pre-op evaluation    0 Result Notes       Component  Ref Range & Units 8/6/24 1201 6/22/21 0819   Hemoglobin A1C  0 - 5.6 % 5.5 5.2 R, CM   Comment: Reference Range  Normal       <5.7%  Prediabetes  5.7-6.4%  Diabetes     >6.4%   Estimated Avg Glucose  mg/dL 111 103   Resulting Agency [facility]              Specimen Collected: 08/06/24 12:01 EDT Last Resulted: 08/06/24 20:22 EDT

## 2024-08-09 ENCOUNTER — OFFICE VISIT (OUTPATIENT)
Dept: ORTHOPEDIC SURGERY | Age: 74
End: 2024-08-09

## 2024-08-09 DIAGNOSIS — Z01.818 PRE-OP EVALUATION: Primary | ICD-10-CM

## 2024-08-09 NOTE — PROGRESS NOTES
Patient ID:  Josey Black  [id number]  74 y.o.  1950    Today: August 9, 2024          CC:  Right  Knee pain    HPI:   The patient has end stage arthritis of the right knee. The patient was evaluated and examined during a consultation prior to today. The patient complains of knee pain with activities, reports pain as mostly occurring along the joint lines, reports stiffness of the knee with prolonged inactivity, and swelling/pain at the end of the day and after increased physical activity. The pain affects the patient’s activities of daily living and quality of life. The patient has attempted and exhausted conservative treatment. There have been no changes to the patient's orthopedic condition since the last office visit.    Past Medical History:  Past Medical History:   Diagnosis Date    Hypertension     controlled with medication    Insomnia     Osteopenia     Prolonged emergence from general anesthesia     Restless leg syndrome        Past Surgical History:  Past Surgical History:   Procedure Laterality Date    CATARACT REMOVAL  01/2019    CHOLECYSTECTOMY      GYN  2005    ovarian cyst    ORTHOPEDIC SURGERY  2003    2 back surgeries( snynovial cyst)        Medications:     Prior to Admission medications    Medication Sig Start Date End Date Taking? Authorizing Provider   acetaminophen (TYLENOL) 500 MG tablet Take 2 tablets by mouth every 6 hours as needed for Pain 8/5/24   Byron Cooper MD   aspirin (ASPIRIN 81) 81 MG EC tablet Take 1 tablet by mouth in the morning and at bedtime Take one tablet morning and evening  Patient not taking: Reported on 8/6/2024 8/5/24   Byron Cooper MD   gabapentin (NEURONTIN) 100 MG capsule Take 1 capsule by mouth 2 times daily for 15 days.  Patient not taking: Reported on 8/6/2024 8/5/24 8/20/24  Byron Cooper MD   meloxicam (MOBIC) 15 MG tablet Take 1 tablet by mouth daily  Patient not taking: Reported on 8/6/2024 8/5/24   Byron Cooper MD   omeprazole

## 2024-08-13 ENCOUNTER — ANESTHESIA EVENT (OUTPATIENT)
Dept: SURGERY | Age: 74
End: 2024-08-13
Payer: MEDICARE

## 2024-08-14 ENCOUNTER — TELEPHONE (OUTPATIENT)
Dept: ORTHOPEDIC SURGERY | Age: 74
End: 2024-08-14

## 2024-08-14 ENCOUNTER — ANESTHESIA (OUTPATIENT)
Dept: SURGERY | Age: 74
End: 2024-08-14
Payer: MEDICARE

## 2024-08-14 ENCOUNTER — HOSPITAL ENCOUNTER (OUTPATIENT)
Age: 74
Discharge: HOME HEALTH | End: 2024-08-15
Attending: ORTHOPAEDIC SURGERY | Admitting: ORTHOPAEDIC SURGERY
Payer: MEDICARE

## 2024-08-14 PROBLEM — M17.11 OSTEOARTHRITIS OF RIGHT KNEE, UNSPECIFIED OSTEOARTHRITIS TYPE: Status: ACTIVE | Noted: 2024-08-14

## 2024-08-14 PROCEDURE — 2500000003 HC RX 250 WO HCPCS: Performed by: NURSE ANESTHETIST, CERTIFIED REGISTERED

## 2024-08-14 PROCEDURE — 97535 SELF CARE MNGMENT TRAINING: CPT

## 2024-08-14 PROCEDURE — 2580000003 HC RX 258: Performed by: ANESTHESIOLOGY

## 2024-08-14 PROCEDURE — 3600000005 HC SURGERY LEVEL 5 BASE: Performed by: ORTHOPAEDIC SURGERY

## 2024-08-14 PROCEDURE — 6360000002 HC RX W HCPCS: Performed by: NURSE PRACTITIONER

## 2024-08-14 PROCEDURE — 3600000015 HC SURGERY LEVEL 5 ADDTL 15MIN: Performed by: ORTHOPAEDIC SURGERY

## 2024-08-14 PROCEDURE — C1776 JOINT DEVICE (IMPLANTABLE): HCPCS | Performed by: ORTHOPAEDIC SURGERY

## 2024-08-14 PROCEDURE — 6360000002 HC RX W HCPCS: Performed by: NURSE ANESTHETIST, CERTIFIED REGISTERED

## 2024-08-14 PROCEDURE — 2580000003 HC RX 258: Performed by: NURSE PRACTITIONER

## 2024-08-14 PROCEDURE — 2720000010 HC SURG SUPPLY STERILE: Performed by: ORTHOPAEDIC SURGERY

## 2024-08-14 PROCEDURE — 6360000002 HC RX W HCPCS: Performed by: ANESTHESIOLOGY

## 2024-08-14 PROCEDURE — 94760 N-INVAS EAR/PLS OXIMETRY 1: CPT

## 2024-08-14 PROCEDURE — 6370000000 HC RX 637 (ALT 250 FOR IP): Performed by: ANESTHESIOLOGY

## 2024-08-14 PROCEDURE — 64447 NJX AA&/STRD FEMORAL NRV IMG: CPT | Performed by: ANESTHESIOLOGY

## 2024-08-14 PROCEDURE — 94761 N-INVAS EAR/PLS OXIMETRY MLT: CPT

## 2024-08-14 PROCEDURE — 2580000003 HC RX 258: Performed by: NURSE ANESTHETIST, CERTIFIED REGISTERED

## 2024-08-14 PROCEDURE — 97530 THERAPEUTIC ACTIVITIES: CPT

## 2024-08-14 PROCEDURE — 2709999900 HC NON-CHARGEABLE SUPPLY: Performed by: ORTHOPAEDIC SURGERY

## 2024-08-14 PROCEDURE — 6370000000 HC RX 637 (ALT 250 FOR IP): Performed by: ORTHOPAEDIC SURGERY

## 2024-08-14 PROCEDURE — 6360000002 HC RX W HCPCS: Performed by: ORTHOPAEDIC SURGERY

## 2024-08-14 PROCEDURE — 97165 OT EVAL LOW COMPLEX 30 MIN: CPT

## 2024-08-14 PROCEDURE — 97161 PT EVAL LOW COMPLEX 20 MIN: CPT

## 2024-08-14 PROCEDURE — 6370000000 HC RX 637 (ALT 250 FOR IP): Performed by: NURSE PRACTITIONER

## 2024-08-14 PROCEDURE — 3700000000 HC ANESTHESIA ATTENDED CARE: Performed by: ORTHOPAEDIC SURGERY

## 2024-08-14 PROCEDURE — 3700000001 HC ADD 15 MINUTES (ANESTHESIA): Performed by: ORTHOPAEDIC SURGERY

## 2024-08-14 PROCEDURE — 7100000000 HC PACU RECOVERY - FIRST 15 MIN: Performed by: ORTHOPAEDIC SURGERY

## 2024-08-14 PROCEDURE — C1713 ANCHOR/SCREW BN/BN,TIS/BN: HCPCS | Performed by: ORTHOPAEDIC SURGERY

## 2024-08-14 PROCEDURE — 7100000001 HC PACU RECOVERY - ADDTL 15 MIN: Performed by: ORTHOPAEDIC SURGERY

## 2024-08-14 RX ORDER — PROPOFOL 10 MG/ML
INJECTION, EMULSION INTRAVENOUS CONTINUOUS PRN
Status: DISCONTINUED | OUTPATIENT
Start: 2024-08-14 | End: 2024-08-14 | Stop reason: SDUPTHER

## 2024-08-14 RX ORDER — KETOROLAC TROMETHAMINE 30 MG/ML
INJECTION, SOLUTION INTRAMUSCULAR; INTRAVENOUS PRN
Status: DISCONTINUED | OUTPATIENT
Start: 2024-08-14 | End: 2024-08-14 | Stop reason: ALTCHOICE

## 2024-08-14 RX ORDER — DEXAMETHASONE SODIUM PHOSPHATE 10 MG/ML
INJECTION, SOLUTION INTRAMUSCULAR; INTRAVENOUS
Status: COMPLETED | OUTPATIENT
Start: 2024-08-14 | End: 2024-08-14

## 2024-08-14 RX ORDER — OXYCODONE HYDROCHLORIDE 5 MG/1
5 TABLET ORAL
Status: COMPLETED | OUTPATIENT
Start: 2024-08-14 | End: 2024-08-14

## 2024-08-14 RX ORDER — IRBESARTAN AND HYDROCHLOROTHIAZIDE 300; 12.5 MG/1; MG/1
1 TABLET, FILM COATED ORAL DAILY
Status: DISCONTINUED | OUTPATIENT
Start: 2024-08-14 | End: 2024-08-14

## 2024-08-14 RX ORDER — MIDAZOLAM HYDROCHLORIDE 2 MG/2ML
2 INJECTION, SOLUTION INTRAMUSCULAR; INTRAVENOUS
Status: COMPLETED | OUTPATIENT
Start: 2024-08-14 | End: 2024-08-14

## 2024-08-14 RX ORDER — NALOXONE HYDROCHLORIDE 0.4 MG/ML
0.4 INJECTION, SOLUTION INTRAMUSCULAR; INTRAVENOUS; SUBCUTANEOUS PRN
Status: DISCONTINUED | OUTPATIENT
Start: 2024-08-14 | End: 2024-08-15 | Stop reason: HOSPADM

## 2024-08-14 RX ORDER — KETOROLAC TROMETHAMINE 15 MG/ML
15 INJECTION, SOLUTION INTRAMUSCULAR; INTRAVENOUS EVERY 8 HOURS
Status: DISCONTINUED | OUTPATIENT
Start: 2024-08-14 | End: 2024-08-15 | Stop reason: HOSPADM

## 2024-08-14 RX ORDER — HYDROMORPHONE HYDROCHLORIDE 1 MG/ML
1 INJECTION, SOLUTION INTRAMUSCULAR; INTRAVENOUS; SUBCUTANEOUS
Status: DISCONTINUED | OUTPATIENT
Start: 2024-08-14 | End: 2024-08-15 | Stop reason: HOSPADM

## 2024-08-14 RX ORDER — ONDANSETRON 4 MG/1
8 TABLET, ORALLY DISINTEGRATING ORAL EVERY 8 HOURS PRN
Status: DISCONTINUED | OUTPATIENT
Start: 2024-08-14 | End: 2024-08-15 | Stop reason: HOSPADM

## 2024-08-14 RX ORDER — ONDANSETRON 2 MG/ML
INJECTION INTRAMUSCULAR; INTRAVENOUS PRN
Status: DISCONTINUED | OUTPATIENT
Start: 2024-08-14 | End: 2024-08-14 | Stop reason: SDUPTHER

## 2024-08-14 RX ORDER — SODIUM CHLORIDE 9 MG/ML
INJECTION, SOLUTION INTRAVENOUS PRN
Status: DISCONTINUED | OUTPATIENT
Start: 2024-08-14 | End: 2024-08-15 | Stop reason: HOSPADM

## 2024-08-14 RX ORDER — PANTOPRAZOLE SODIUM 40 MG/1
40 TABLET, DELAYED RELEASE ORAL
Status: DISCONTINUED | OUTPATIENT
Start: 2024-08-15 | End: 2024-08-15 | Stop reason: HOSPADM

## 2024-08-14 RX ORDER — ROPIVACAINE HYDROCHLORIDE 2 MG/ML
INJECTION, SOLUTION EPIDURAL; INFILTRATION; PERINEURAL PRN
Status: DISCONTINUED | OUTPATIENT
Start: 2024-08-14 | End: 2024-08-14 | Stop reason: ALTCHOICE

## 2024-08-14 RX ORDER — TRANEXAMIC ACID 650 MG/1
1300 TABLET ORAL
Status: COMPLETED | OUTPATIENT
Start: 2024-08-14 | End: 2024-08-14

## 2024-08-14 RX ORDER — TRAMADOL HYDROCHLORIDE 50 MG/1
50 TABLET ORAL EVERY 4 HOURS PRN
Status: DISCONTINUED | OUTPATIENT
Start: 2024-08-14 | End: 2024-08-14

## 2024-08-14 RX ORDER — DIPHENHYDRAMINE HCL 25 MG
25 CAPSULE ORAL EVERY 6 HOURS PRN
Status: DISCONTINUED | OUTPATIENT
Start: 2024-08-14 | End: 2024-08-15 | Stop reason: HOSPADM

## 2024-08-14 RX ORDER — MELOXICAM 7.5 MG/1
7.5 TABLET ORAL 2 TIMES DAILY
Status: DISCONTINUED | OUTPATIENT
Start: 2024-08-17 | End: 2024-08-15 | Stop reason: HOSPADM

## 2024-08-14 RX ORDER — OXYCODONE HYDROCHLORIDE 5 MG/1
10 TABLET ORAL EVERY 4 HOURS PRN
Status: DISCONTINUED | OUTPATIENT
Start: 2024-08-14 | End: 2024-08-14

## 2024-08-14 RX ORDER — ROPIVACAINE HYDROCHLORIDE 2 MG/ML
INJECTION, SOLUTION EPIDURAL; INFILTRATION; PERINEURAL
Status: COMPLETED | OUTPATIENT
Start: 2024-08-14 | End: 2024-08-14

## 2024-08-14 RX ORDER — SCOLOPAMINE TRANSDERMAL SYSTEM 1 MG/1
1 PATCH, EXTENDED RELEASE TRANSDERMAL
Status: DISCONTINUED | OUTPATIENT
Start: 2024-08-14 | End: 2024-08-14 | Stop reason: HOSPADM

## 2024-08-14 RX ORDER — DEXAMETHASONE SODIUM PHOSPHATE 10 MG/ML
INJECTION INTRAMUSCULAR; INTRAVENOUS PRN
Status: DISCONTINUED | OUTPATIENT
Start: 2024-08-14 | End: 2024-08-14 | Stop reason: SDUPTHER

## 2024-08-14 RX ORDER — FENTANYL CITRATE 50 UG/ML
100 INJECTION, SOLUTION INTRAMUSCULAR; INTRAVENOUS
Status: DISCONTINUED | OUTPATIENT
Start: 2024-08-14 | End: 2024-08-14 | Stop reason: HOSPADM

## 2024-08-14 RX ORDER — SODIUM CHLORIDE 0.9 % (FLUSH) 0.9 %
5-40 SYRINGE (ML) INJECTION PRN
Status: DISCONTINUED | OUTPATIENT
Start: 2024-08-14 | End: 2024-08-14 | Stop reason: HOSPADM

## 2024-08-14 RX ORDER — ONDANSETRON 2 MG/ML
4 INJECTION INTRAMUSCULAR; INTRAVENOUS EVERY 6 HOURS PRN
Status: DISCONTINUED | OUTPATIENT
Start: 2024-08-14 | End: 2024-08-15 | Stop reason: HOSPADM

## 2024-08-14 RX ORDER — TIZANIDINE 2 MG/1
4 TABLET ORAL 3 TIMES DAILY PRN
Status: DISCONTINUED | OUTPATIENT
Start: 2024-08-14 | End: 2024-08-15 | Stop reason: HOSPADM

## 2024-08-14 RX ORDER — FENTANYL CITRATE 50 UG/ML
INJECTION, SOLUTION INTRAMUSCULAR; INTRAVENOUS PRN
Status: DISCONTINUED | OUTPATIENT
Start: 2024-08-14 | End: 2024-08-14 | Stop reason: SDUPTHER

## 2024-08-14 RX ORDER — MIDAZOLAM HYDROCHLORIDE 1 MG/ML
INJECTION INTRAMUSCULAR; INTRAVENOUS PRN
Status: DISCONTINUED | OUTPATIENT
Start: 2024-08-14 | End: 2024-08-14 | Stop reason: SDUPTHER

## 2024-08-14 RX ORDER — SODIUM CHLORIDE, SODIUM LACTATE, POTASSIUM CHLORIDE, CALCIUM CHLORIDE 600; 310; 30; 20 MG/100ML; MG/100ML; MG/100ML; MG/100ML
INJECTION, SOLUTION INTRAVENOUS CONTINUOUS
Status: DISCONTINUED | OUTPATIENT
Start: 2024-08-14 | End: 2024-08-14 | Stop reason: HOSPADM

## 2024-08-14 RX ORDER — PROMETHAZINE HYDROCHLORIDE 25 MG/1
25 TABLET ORAL EVERY 6 HOURS PRN
Status: DISCONTINUED | OUTPATIENT
Start: 2024-08-14 | End: 2024-08-15 | Stop reason: HOSPADM

## 2024-08-14 RX ORDER — ALBUTEROL SULFATE 2.5 MG/3ML
2.5 SOLUTION RESPIRATORY (INHALATION) EVERY 6 HOURS PRN
Status: DISCONTINUED | OUTPATIENT
Start: 2024-08-14 | End: 2024-08-15 | Stop reason: HOSPADM

## 2024-08-14 RX ORDER — RALOXIFENE HYDROCHLORIDE 60 MG/1
60 TABLET, FILM COATED ORAL DAILY
Status: DISCONTINUED | OUTPATIENT
Start: 2024-08-15 | End: 2024-08-15 | Stop reason: HOSPADM

## 2024-08-14 RX ORDER — ASPIRIN 81 MG/1
81 TABLET ORAL 2 TIMES DAILY
Status: DISCONTINUED | OUTPATIENT
Start: 2024-08-14 | End: 2024-08-15 | Stop reason: HOSPADM

## 2024-08-14 RX ORDER — SENNA AND DOCUSATE SODIUM 50; 8.6 MG/1; MG/1
1 TABLET, FILM COATED ORAL 2 TIMES DAILY
Status: DISCONTINUED | OUTPATIENT
Start: 2024-08-14 | End: 2024-08-15 | Stop reason: HOSPADM

## 2024-08-14 RX ORDER — SODIUM CHLORIDE, SODIUM LACTATE, POTASSIUM CHLORIDE, CALCIUM CHLORIDE 600; 310; 30; 20 MG/100ML; MG/100ML; MG/100ML; MG/100ML
INJECTION, SOLUTION INTRAVENOUS CONTINUOUS PRN
Status: DISCONTINUED | OUTPATIENT
Start: 2024-08-14 | End: 2024-08-14 | Stop reason: SDUPTHER

## 2024-08-14 RX ORDER — TRAMADOL HYDROCHLORIDE 50 MG/1
50 TABLET ORAL EVERY 4 HOURS PRN
Status: DISCONTINUED | OUTPATIENT
Start: 2024-08-14 | End: 2024-08-15 | Stop reason: HOSPADM

## 2024-08-14 RX ORDER — DIPHENHYDRAMINE HYDROCHLORIDE 50 MG/ML
25 INJECTION INTRAMUSCULAR; INTRAVENOUS EVERY 6 HOURS PRN
Status: DISCONTINUED | OUTPATIENT
Start: 2024-08-14 | End: 2024-08-15 | Stop reason: HOSPADM

## 2024-08-14 RX ORDER — SODIUM CHLORIDE 0.9 % (FLUSH) 0.9 %
5-40 SYRINGE (ML) INJECTION EVERY 12 HOURS SCHEDULED
Status: DISCONTINUED | OUTPATIENT
Start: 2024-08-14 | End: 2024-08-14 | Stop reason: HOSPADM

## 2024-08-14 RX ORDER — SODIUM CHLORIDE 9 MG/ML
INJECTION, SOLUTION INTRAVENOUS PRN
Status: DISCONTINUED | OUTPATIENT
Start: 2024-08-14 | End: 2024-08-14 | Stop reason: HOSPADM

## 2024-08-14 RX ORDER — TRANEXAMIC ACID 100 MG/ML
INJECTION, SOLUTION INTRAVENOUS PRN
Status: DISCONTINUED | OUTPATIENT
Start: 2024-08-14 | End: 2024-08-14 | Stop reason: SDUPTHER

## 2024-08-14 RX ORDER — FENTANYL CITRATE 50 UG/ML
25 INJECTION, SOLUTION INTRAMUSCULAR; INTRAVENOUS EVERY 5 MIN PRN
Status: DISCONTINUED | OUTPATIENT
Start: 2024-08-14 | End: 2024-08-14 | Stop reason: HOSPADM

## 2024-08-14 RX ORDER — TRAZODONE HYDROCHLORIDE 50 MG/1
50 TABLET ORAL NIGHTLY
Status: DISCONTINUED | OUTPATIENT
Start: 2024-08-14 | End: 2024-08-15 | Stop reason: HOSPADM

## 2024-08-14 RX ORDER — OXYCODONE HYDROCHLORIDE 5 MG/1
5 TABLET ORAL EVERY 4 HOURS PRN
Status: DISCONTINUED | OUTPATIENT
Start: 2024-08-14 | End: 2024-08-14

## 2024-08-14 RX ORDER — EPHEDRINE SULFATE/0.9% NACL/PF 50 MG/5 ML
SYRINGE (ML) INTRAVENOUS PRN
Status: DISCONTINUED | OUTPATIENT
Start: 2024-08-14 | End: 2024-08-14 | Stop reason: SDUPTHER

## 2024-08-14 RX ORDER — GABAPENTIN 100 MG/1
100 CAPSULE ORAL 2 TIMES DAILY
Status: DISCONTINUED | OUTPATIENT
Start: 2024-08-14 | End: 2024-08-15 | Stop reason: HOSPADM

## 2024-08-14 RX ORDER — DEXAMETHASONE SODIUM PHOSPHATE 10 MG/ML
10 INJECTION INTRAMUSCULAR; INTRAVENOUS ONCE
Status: DISCONTINUED | OUTPATIENT
Start: 2024-08-15 | End: 2024-08-15 | Stop reason: HOSPADM

## 2024-08-14 RX ORDER — HYDROCHLOROTHIAZIDE 25 MG/1
12.5 TABLET ORAL DAILY
Status: DISCONTINUED | OUTPATIENT
Start: 2024-08-15 | End: 2024-08-15 | Stop reason: HOSPADM

## 2024-08-14 RX ORDER — ONDANSETRON 4 MG/1
4 TABLET, ORALLY DISINTEGRATING ORAL EVERY 8 HOURS PRN
Status: DISCONTINUED | OUTPATIENT
Start: 2024-08-14 | End: 2024-08-15 | Stop reason: HOSPADM

## 2024-08-14 RX ORDER — SODIUM CHLORIDE 0.9 % (FLUSH) 0.9 %
5-40 SYRINGE (ML) INJECTION PRN
Status: DISCONTINUED | OUTPATIENT
Start: 2024-08-14 | End: 2024-08-15 | Stop reason: HOSPADM

## 2024-08-14 RX ORDER — ACETAMINOPHEN 500 MG
1000 TABLET ORAL EVERY 6 HOURS
Status: DISCONTINUED | OUTPATIENT
Start: 2024-08-14 | End: 2024-08-15 | Stop reason: HOSPADM

## 2024-08-14 RX ORDER — NALOXONE HYDROCHLORIDE 0.4 MG/ML
INJECTION, SOLUTION INTRAMUSCULAR; INTRAVENOUS; SUBCUTANEOUS PRN
Status: DISCONTINUED | OUTPATIENT
Start: 2024-08-14 | End: 2024-08-14 | Stop reason: HOSPADM

## 2024-08-14 RX ORDER — LOSARTAN POTASSIUM 50 MG/1
100 TABLET ORAL DAILY
Status: DISCONTINUED | OUTPATIENT
Start: 2024-08-15 | End: 2024-08-15 | Stop reason: HOSPADM

## 2024-08-14 RX ORDER — DIPHENHYDRAMINE HYDROCHLORIDE 50 MG/ML
12.5 INJECTION INTRAMUSCULAR; INTRAVENOUS
Status: DISCONTINUED | OUTPATIENT
Start: 2024-08-14 | End: 2024-08-14 | Stop reason: HOSPADM

## 2024-08-14 RX ORDER — METOCLOPRAMIDE HYDROCHLORIDE 5 MG/ML
10 INJECTION INTRAMUSCULAR; INTRAVENOUS
Status: DISCONTINUED | OUTPATIENT
Start: 2024-08-14 | End: 2024-08-14 | Stop reason: HOSPADM

## 2024-08-14 RX ORDER — SULFAMETHOXAZOLE AND TRIMETHOPRIM 800; 160 MG/1; MG/1
1 TABLET ORAL 2 TIMES DAILY
Qty: 20 TABLET | Refills: 0 | Status: SHIPPED | OUTPATIENT
Start: 2024-08-14 | End: 2024-08-24

## 2024-08-14 RX ORDER — ONDANSETRON 2 MG/ML
4 INJECTION INTRAMUSCULAR; INTRAVENOUS
Status: COMPLETED | OUTPATIENT
Start: 2024-08-14 | End: 2024-08-14

## 2024-08-14 RX ORDER — SODIUM CHLORIDE 9 MG/ML
INJECTION, SOLUTION INTRAVENOUS CONTINUOUS
Status: DISCONTINUED | OUTPATIENT
Start: 2024-08-14 | End: 2024-08-15 | Stop reason: HOSPADM

## 2024-08-14 RX ORDER — SODIUM CHLORIDE 0.9 % (FLUSH) 0.9 %
5-40 SYRINGE (ML) INJECTION EVERY 12 HOURS SCHEDULED
Status: DISCONTINUED | OUTPATIENT
Start: 2024-08-14 | End: 2024-08-15 | Stop reason: HOSPADM

## 2024-08-14 RX ADMIN — ACETAMINOPHEN 1000 MG: 500 TABLET, FILM COATED ORAL at 23:23

## 2024-08-14 RX ADMIN — SODIUM CHLORIDE, SODIUM LACTATE, POTASSIUM CHLORIDE, AND CALCIUM CHLORIDE: 600; 310; 30; 20 INJECTION, SOLUTION INTRAVENOUS at 06:55

## 2024-08-14 RX ADMIN — ROPIVACAINE HYDROCHLORIDE 20 ML: 2 INJECTION, SOLUTION EPIDURAL; INFILTRATION at 06:45

## 2024-08-14 RX ADMIN — CEFAZOLIN 2000 MG: 10 INJECTION, POWDER, FOR SOLUTION INTRAVENOUS at 23:24

## 2024-08-14 RX ADMIN — Medication 2000 MG: at 06:55

## 2024-08-14 RX ADMIN — TRANEXAMIC ACID 1300 MG: 650 TABLET ORAL at 16:08

## 2024-08-14 RX ADMIN — TRAMADOL HYDROCHLORIDE 50 MG: 50 TABLET ORAL at 23:33

## 2024-08-14 RX ADMIN — ONDANSETRON 4 MG: 2 INJECTION INTRAMUSCULAR; INTRAVENOUS at 07:01

## 2024-08-14 RX ADMIN — SODIUM CHLORIDE, PRESERVATIVE FREE 10 ML: 5 INJECTION INTRAVENOUS at 20:47

## 2024-08-14 RX ADMIN — SENNOSIDES AND DOCUSATE SODIUM 1 TABLET: 50; 8.6 TABLET ORAL at 20:46

## 2024-08-14 RX ADMIN — MIDAZOLAM 2 MG: 1 INJECTION INTRAMUSCULAR; INTRAVENOUS at 06:44

## 2024-08-14 RX ADMIN — DEXAMETHASONE SODIUM PHOSPHATE 5 MG: 10 INJECTION, SOLUTION INTRAMUSCULAR; INTRAVENOUS at 06:45

## 2024-08-14 RX ADMIN — DEXAMETHASONE SODIUM PHOSPHATE 10 MG: 10 INJECTION INTRAMUSCULAR; INTRAVENOUS at 07:16

## 2024-08-14 RX ADMIN — SODIUM CHLORIDE, POTASSIUM CHLORIDE, SODIUM LACTATE AND CALCIUM CHLORIDE: 600; 310; 30; 20 INJECTION, SOLUTION INTRAVENOUS at 06:12

## 2024-08-14 RX ADMIN — OXYCODONE 10 MG: 5 TABLET ORAL at 13:43

## 2024-08-14 RX ADMIN — CEFAZOLIN 2000 MG: 10 INJECTION, POWDER, FOR SOLUTION INTRAVENOUS at 16:08

## 2024-08-14 RX ADMIN — PHENYLEPHRINE HYDROCHLORIDE 100 MCG: 0.1 INJECTION, SOLUTION INTRAVENOUS at 07:37

## 2024-08-14 RX ADMIN — PHENYLEPHRINE HYDROCHLORIDE 50 MCG: 0.1 INJECTION, SOLUTION INTRAVENOUS at 07:30

## 2024-08-14 RX ADMIN — TRANEXAMIC ACID 1000 MG: 100 INJECTION, SOLUTION INTRAVENOUS at 08:08

## 2024-08-14 RX ADMIN — PROPOFOL 100 MCG/KG/MIN: 10 INJECTION, EMULSION INTRAVENOUS at 07:07

## 2024-08-14 RX ADMIN — MIDAZOLAM 2 MG: 1 INJECTION INTRAMUSCULAR; INTRAVENOUS at 06:55

## 2024-08-14 RX ADMIN — SODIUM CHLORIDE, PRESERVATIVE FREE 10 ML: 5 INJECTION INTRAVENOUS at 09:51

## 2024-08-14 RX ADMIN — ONDANSETRON 4 MG: 2 INJECTION INTRAMUSCULAR; INTRAVENOUS at 08:49

## 2024-08-14 RX ADMIN — TRANEXAMIC ACID 1300 MG: 650 TABLET ORAL at 11:01

## 2024-08-14 RX ADMIN — SODIUM CHLORIDE: 9 INJECTION, SOLUTION INTRAVENOUS at 16:41

## 2024-08-14 RX ADMIN — Medication 10 MG: at 07:19

## 2024-08-14 RX ADMIN — MEPIVACAINE HYDROCHLORIDE 60 MG: 20 INJECTION, SOLUTION EPIDURAL; INFILTRATION at 07:05

## 2024-08-14 RX ADMIN — FENTANYL CITRATE 50 MCG: 50 INJECTION, SOLUTION INTRAMUSCULAR; INTRAVENOUS at 08:06

## 2024-08-14 RX ADMIN — PHENYLEPHRINE HYDROCHLORIDE 50 MCG: 0.1 INJECTION, SOLUTION INTRAVENOUS at 07:24

## 2024-08-14 RX ADMIN — ACETAMINOPHEN 1000 MG: 500 TABLET, FILM COATED ORAL at 11:01

## 2024-08-14 RX ADMIN — PROMETHAZINE HYDROCHLORIDE 25 MG: 25 TABLET ORAL at 15:05

## 2024-08-14 RX ADMIN — KETOROLAC TROMETHAMINE 15 MG: 15 INJECTION, SOLUTION INTRAMUSCULAR; INTRAVENOUS at 18:11

## 2024-08-14 RX ADMIN — ACETAMINOPHEN 1000 MG: 500 TABLET, FILM COATED ORAL at 18:11

## 2024-08-14 RX ADMIN — OXYCODONE 5 MG: 5 TABLET ORAL at 08:49

## 2024-08-14 RX ADMIN — FENTANYL CITRATE 50 MCG: 50 INJECTION, SOLUTION INTRAMUSCULAR; INTRAVENOUS at 08:22

## 2024-08-14 RX ADMIN — TIZANIDINE 4 MG: 2 TABLET ORAL at 11:01

## 2024-08-14 RX ADMIN — ASPIRIN 81 MG: 81 TABLET, COATED ORAL at 20:46

## 2024-08-14 RX ADMIN — OXYCODONE 5 MG: 5 TABLET ORAL at 09:49

## 2024-08-14 RX ADMIN — TRANEXAMIC ACID 1000 MG: 100 INJECTION, SOLUTION INTRAVENOUS at 07:02

## 2024-08-14 RX ADMIN — GABAPENTIN 100 MG: 100 CAPSULE ORAL at 20:46

## 2024-08-14 ASSESSMENT — PAIN SCALES - GENERAL
PAINLEVEL_OUTOF10: 5
PAINLEVEL_OUTOF10: 7
PAINLEVEL_OUTOF10: 0
PAINLEVEL_OUTOF10: 0
PAINLEVEL_OUTOF10: 4
PAINLEVEL_OUTOF10: 2
PAINLEVEL_OUTOF10: 2
PAINLEVEL_OUTOF10: 4
PAINLEVEL_OUTOF10: 4
PAINLEVEL_OUTOF10: 1
PAINLEVEL_OUTOF10: 1
PAINLEVEL_OUTOF10: 4

## 2024-08-14 ASSESSMENT — PAIN DESCRIPTION - LOCATION
LOCATION: KNEE

## 2024-08-14 ASSESSMENT — PAIN DESCRIPTION - PAIN TYPE: TYPE: SURGICAL PAIN

## 2024-08-14 ASSESSMENT — PAIN DESCRIPTION - ORIENTATION
ORIENTATION: RIGHT

## 2024-08-14 ASSESSMENT — PAIN DESCRIPTION - DESCRIPTORS
DESCRIPTORS: TINGLING;DISCOMFORT
DESCRIPTORS: ACHING
DESCRIPTORS: ACHING;SPASM

## 2024-08-14 ASSESSMENT — PAIN - FUNCTIONAL ASSESSMENT
PAIN_FUNCTIONAL_ASSESSMENT: 0-10
PAIN_FUNCTIONAL_ASSESSMENT: ACTIVITIES ARE NOT PREVENTED

## 2024-08-14 ASSESSMENT — PAIN DESCRIPTION - ONSET: ONSET: GRADUAL

## 2024-08-14 NOTE — OP NOTE
[facility]  Total Knee Arthroplasty  Patient:Josey Black   : 1950  Medical Record Number:[id number]    Pre-operative Diagnosis: Primary osteoarthritis of right knee [M17.11]  Acquired deformity of right knee [M21.961]    Post-operative Diagnosis: Same    Location: [facility]    Date of Procedure: 2024    Surgeon: Byron Cooper MD    Assistant: Tatum Lewis CFA    Anesthesia: Spinal + adductor nerve block    Procedure:  Imageless Computer-Navigated Right Total Knee Arthroplasty    Tourniquet Time: Tourniquet Not Used    BMI: Body mass index is 26.27 kg/m².    EBL: 200cc    Complications: None    Patient Condition upon Completion of Procedure: Stable    Implants:   Implant Name Type Inv. Item Serial No.  Lot No. LRB No. Used Action   INSERT TIB CNDYL STBL 3 11 MM KNEE 5/PK X3 TRIATHLON - [id number]  INSERT TIB CNDYL STBL 3 11 MM KNEE 5/PK X3 TRIATHLON  [employer]tron [id number] Right 1 Implanted   COMPONENT FEM SZ 3 R KNEE CRUCE RET CEMENTLESS BEAD W/ GERONIMO - [id number]  COMPONENT FEM SZ 3 R KNEE CRUCE RET CEMENTLESS BEAD W/ GERONIMO  [employer] [id number] Right 1 Implanted   BASEPLATE TIB SZ 3 AP44MM ML67MM KNEE TRITANIUM 4 CRUCFRM - [id number]  BASEPLATE TIB SZ 3 AP44MM ML67MM KNEE TRITANIUM 4 CRUCFRM  [employer] [id number] Right 1 Implanted   COMPONENT PAT [id number] SUPERIOR/INFERIOR KNEE - [id number]  COMPONENT PAT [id number] SUPERIOR/INFERIOR KNEE  [employer]tron [id number] Right 1 Implanted       Pre-Operative Plan/Implants:   - #3 Femoral Component   - #3 Tibial Component   - 11 mm Polyethylene Component    Intra-Operative Findings: Prior to bony resection we found that the patient's knee lacked approximately 12 degrees of extension. Also prior to bony resection we noted a varus coronal deformity. Intra-operatively we noted that the articular surfaces were arthritic with cartilage loss of both

## 2024-08-14 NOTE — PROGRESS NOTES
4 Eyes Skin Assessment     NAME:  Josey Black  YOB: 1950  MEDICAL RECORD NUMBER:  [id number]    The patient is being assessed for  Post-Op Surgical    I agree that at least one RN has performed a thorough Head to Toe Skin Assessment on the patient. ALL assessment sites listed below have been assessed.      Areas assessed by both nurses:    Head, Face, Ears, Shoulders, Back, Chest, Arms, Elbows, Hands, Sacrum. Buttock, Coccyx, Ischium, Legs. Feet and Heels, and Under Medical Devices         Does the Patient have a Wound? No noted wound(s)       Moses Prevention initiated by RN: No  Wound Care Orders initiated by RN: No    Pressure Injury (Stage 3,4, Unstageable, DTI, NWPT, and Complex wounds) if present, place Wound referral order by RN under : No    New Ostomies, if present place, Ostomy referral order under : No     Nurse 1 eSignature: Electronically signed by Phoenix M Chimato, RN on 8/14/24 at 9:27 AM EDT    **SHARE this note so that the co-signing nurse can place an eSignature**    Nurse 2 eSignature: Electronically signed by Susan Velásquez RN on 8/14/24 at 9:29 AM EDT

## 2024-08-14 NOTE — CARE COORDINATION
Patient is a 74 y.o. year old female admitted for Right TKA . Patient plans to return home on discharge. Order received to arrange home health. Patient without preference towards agency. Referral sent to [facility]. . Patient requesting we arrange a JR walker. Pt without preference towards provider. Referral sent to [facility]. Equipment delivered to the hospital room prior to discharge. Will follow until discharge.      08/14/24 3509   Service Assessment   Patient Orientation Alert and Oriented   Cognition Alert   History Provided By Patient   Primary Caregiver Self   Accompanied By/Relationship spouse   Support Systems Spouse/Significant Other   Patient's Healthcare Decision Maker is: Legal Next of Kin   PCP Verified by CM No   Prior Functional Level Independent in ADLs/IADLs   Current Functional Level Assistance with the following:;Cooking;Housework;Shopping   Can patient return to prior living arrangement Yes   Ability to make needs known: Good   Family able to assist with home care needs: Yes   Would you like for me to discuss the discharge plan with any other family members/significant others, and if so, who? No   Financial Resources Medicare   Services At/After Discharge   Transition of Care Consult (CM Consult) Home Health   Internal Home Health Yes   Services At/After Discharge Home Health;PT   Mode of Transport at Discharge Self   Confirm Follow Up Transport Self   Condition of Participation: Discharge Planning   The Plan for Transition of Care is related to the following treatment goals: improve mobility   The Patient and/or Patient Representative was provided with a Choice of Provider? Patient   The Patient and/Or Patient Representative agree with the Discharge Plan? Yes   Freedom of Choice list was provided with basic dialogue that supports the patient's individualized plan of care/goals, treatment preferences, and shares the quality data associated with the providers?  Yes

## 2024-08-14 NOTE — ANESTHESIA POSTPROCEDURE EVALUATION
Department of Anesthesiology  Postprocedure Note    Patient: Josey Black  MRN: [id number]  YOB: 1950  Date of evaluation: 8/14/2024    Procedure Summary       Date: 08/14/24 Room / Location: [facility] MAIN OR 06 / E MAIN OR    Anesthesia Start: 0650 Anesthesia Stop: 0836    Procedure: SDD - RIGHT KNEE TOTAL ARTHROPLASTY ROBOTIC, [place]/VIKTORIYA (Right: Knee) Diagnosis:       Primary osteoarthritis of right knee      Acquired deformity of right knee      (Primary osteoarthritis of right knee [M17.11])      (Acquired deformity of right knee [M21.961])    Surgeons: Byron Cooper MD Responsible Provider: Duke Beard MD    Anesthesia Type: spinal ASA Status: 2            Anesthesia Type: No value filed.    Lisa Phase I: Lias Score: 10    Lisa Phase II:      Anesthesia Post Evaluation    Patient location during evaluation: PACU  Patient participation: complete - patient participated  Level of consciousness: awake and awake and alert  Airway patency: patent  Nausea & Vomiting: no nausea  Cardiovascular status: hemodynamically stable  Respiratory status: acceptable  Hydration status: euvolemic  Multimodal analgesia pain management approach  Pain management: adequate    No notable events documented.

## 2024-08-14 NOTE — H&P
Objective:         General: No Acute distress                   HEENT: Normocephalic/atramatic                   Lungs:  Breathing non-labored                   Heart:   RRR                    Abdomen: soft       Extremities:  Prior exam done in office has been consistent with end-stage knee arthritis. We have noted pain with ROM of the right knee. Trace effusion. Crepitus present. Distally the patient shows no neurologic deficit. Antalgic gait appreciated.     Meds:   Current Facility-Administered Medications   Medication Dose Route Frequency    fentaNYL (SUBLIMAZE) injection 100 mcg  100 mcg IntraVENous Once PRN    scopolamine (TRANSDERM-SCOP) transdermal patch 1 patch  1 patch TransDERmal Q72H    lactated ringers IV soln infusion   IntraVENous Continuous    sodium chloride flush 0.9 % injection 5-40 mL  5-40 mL IntraVENous 2 times per day    midazolam PF (VERSED) injection 2 mg  2 mg IntraVENous Once PRN    ceFAZolin (ANCEF) 2000 mg in sterile water 20 mL IV syringe  2,000 mg IntraVENous On Call to OR    sodium chloride flush 0.9 % injection 5-40 mL  5-40 mL IntraVENous 2 times per day    sodium chloride flush 0.9 % injection 5-40 mL  5-40 mL IntraVENous PRN    0.9 % sodium chloride infusion   IntraVENous PRN       Patient Active Problem List   Diagnosis    Insomnia    Osteopenia    Restless leg syndrome    Primary osteoarthritis of right knee    Acquired deformity of right knee    Osteoarthritis of right knee, unspecified osteoarthritis type       Assessment:   1. Arthritis of the Right knee    Plan:   The patient has failed previous conservative treatment for this condition including anti-inflammatories, injections and lifestyle modifications. The necessity for joint replacement is present. Risks, benefits, alternatives and possible complications of right knee arthroplasty have been discussed with the patient including but not limited to potential for infection, bleeding, damage to nerves and/or blood

## 2024-08-14 NOTE — PROGRESS NOTES
TRANSFER - IN REPORT:    Verbal report received from Nilay RN on Josey Black  being received from PACU for routine post-op      Report consisted of patient's Situation, Background, Assessment and   Recommendations(SBAR).     Information from the following report(s) Nurse Handoff Report and MAR was reviewed with the receiving nurse.    Opportunity for questions and clarification was provided.      Assessment completed upon patient's arrival to unit and care assumed.

## 2024-08-14 NOTE — DISCHARGE INSTRUCTIONS
[facility]      Patient Discharge Instructions    Josey Black / [id number] : 1950    Admitted 2024 Discharged: 2024     IF YOU HAVE ANY PROBLEMS ONCE YOU ARE AT HOME CALL THE FOLLOWING NUMBERS:   Main office number: [phone]      Medications    The medications you are to continue on are listed on the medication reconciliation sheet.   Narcotic pain medications as well as supplemental iron can cause constipation. If this occurs try stopping the narcotic pain medication and/or the iron.   It is important that you take the medication exactly as they are prescribed.  Medications which increase your risk of blood clots are listed to stop for 5 weeks after surgery as well as medications or supplements which increase your risk of bleeding complications.   Keep your medication in the bottles provided by the pharmacist and keep a list of the medication names, dosages, and times to be taken in your wallet.   Do not take other medications without consulting your doctor.       Important Information    Do NOT smoke as this will greatly increase your risk of infection!    Resume your prehospital diet. If you have excessive nausea or vomitting call your doctor's office     Leg swelling and warmth is normal for 6 months after surgery. If you experience swelling in your leg elevate you leg while laying down with your toes above your heart. If you have sudden onset severe swelling with leg pain call our office. Use Jarad Hose stockings until we see you in the office for your follow up appointment.    The stitches deep inside take approximately 6 months to dissolve. There will be sharp shooting, stinging and burning pain. This is normal and will resolve between 3-6 months after surgery.     Difficulty sleeping is normal following total Knee and Hip replacement. You may try melatonin, an over-the-counter sleep aid or benadryl to help with sleep. Most patients will resume sleeping through the night 8

## 2024-08-14 NOTE — PROGRESS NOTES
ACUTE PHYSICAL THERAPY GOALS:   (Developed with and agreed upon by patient and/or caregiver.)  GOALS (1-4 days):  (1.)Ms. Black will move from supine to sit and sit to supine  in bed with SUPERVISION.  (2.)Ms. Black will transfer from bed to chair and chair to bed with SUPERVISION using the least restrictive device.  (3.)Ms. Black will ambulate with SUPERVISION for 200 feet with the least restrictive device.  (4.)Ms. Black will ambulate up/down 1 steps with bilateral  railing or with Rw with MINIMAL ASSIST.met  (5.)Ms. Black will increase right knee ROM to 3-90°.  ________________________________________________________________________________________________            PHYSICAL THERAPY: TOTAL KNEE ARTHROPLASTY Initial Assessment and PM  (Link to Caseload Tracking: PT Visit Days : 1  Acknowledge Orders  Time In/Out  PT Charge Capture  Rehab Caseload Tracker  Episode   Josey Black is a 74 y.o. female   PRIMARY DIAGNOSIS: Osteoarthritis of right knee, unspecified osteoarthritis type  Primary osteoarthritis of right knee [M17.11]  Acquired deformity of right knee [M21.961]  Osteoarthritis of right knee, unspecified osteoarthritis type [M17.11]  Procedure(s) (LRB):  SDD - RIGHT KNEE TOTAL ARTHROPLASTY ROBOTIC, [place]/VIKTORIYA (Right)  Day of Surgery  Reason for Referral: Pain in Right Knee (M25.561)  Stiffness of Right Knee, Not elsewhere classified (M25.661)  Difficulty in walking, Not elsewhere classified (R26.2)  Outpatient in a bed: Payor: MEDICARE / Plan: MEDICARE PART A AND B / Product Type: *No Product type* /     REHAB RECOMMENDATIONS:   Recommendation to date pending progress:  Setting:  Home Health Therapy    Equipment:     Has RW     RANGE OF MOTION:   Right Knee Flexion: R Knee Flexion (0-145): 80  Right Knee Extension: R Knee Extension (0): 5     GAIT: I Mod I S SBA CGA Min Mod Max Total  NT x2 Comments:   Level of Assistance [] [] [] [x] [] [] [] [] [] [] []            Weightbearing Status

## 2024-08-14 NOTE — ANESTHESIA PROCEDURE NOTES
Peripheral Block    Patient location during procedure: pre-op  Reason for block: post-op pain management and at surgeon's request  Start time: 8/14/2024 6:43 AM  End time: 8/14/2024 6:45 AM  Staffing  Anesthesiologist: Duke Beard MD  Performed by: Duke Beard MD  Authorized by: Duke Beard MD    Preanesthetic Checklist  Completed: patient identified, IV checked, site marked, risks and benefits discussed, surgical/procedural consents, equipment checked, pre-op evaluation, timeout performed, anesthesia consent given, oxygen available and monitors applied/VS acknowledged  Peripheral Block   Patient position: prone  Prep: ChloraPrep  Provider prep: mask and sterile gloves  Patient monitoring: cardiac monitor, continuous pulse ox, frequent blood pressure checks, IV access, oxygen and responsive to questions  Block type: Femoral  Adductor canal  Laterality: right  Injection technique: single-shot  Guidance: ultrasound guided    Needle   Needle type: insulated echogenic nerve stimulator needle   Needle gauge: 22 G  Needle localization: anatomical landmarks and ultrasound guidance  Test dose: negative  Needle length: 4.  Assessment   Injection assessment: negative aspiration for heme, no paresthesia on injection and local visualized surrounding nerve on ultrasound  Slow fractionated injection: yes  Hemodynamics: stable  Outcomes: uncomplicated    Medications Administered  dexAMETHasone (DECADRON) (PF) 10 mg/mL injection - Other   5 mg - 8/14/2024 6:45:00 AM  ropivacaine (NAROPIN) injection 0.2% - Perineural   20 mL - 8/14/2024 6:45:00 AM

## 2024-08-14 NOTE — ANESTHESIA PRE PROCEDURE
Department of Anesthesiology  Preprocedure Note       Name:  Josey Black   Age:  74 y.o.  :  1950                                          MRN:  [id number]         Date:  2024      Surgeon: Surgeon(s):  Byron Cooper MD    Procedure: Procedure(s):  SDD - RIGHT KNEE TOTAL ARTHROPLASTY ROBOTIC, [place]/VIKTORIYA    Medications prior to admission:   Prior to Admission medications    Medication Sig Start Date End Date Taking? Authorizing Provider   acetaminophen (TYLENOL) 500 MG tablet Take 2 tablets by mouth every 6 hours as needed for Pain 24  Yes Byron Cooper MD   irbesartan-hydroCHLOROthiazide (AVALIDE) 300-12.5 MG per tablet Take 1 tablet by mouth daily TAKE 1 TABLET BY MOUTH ONCE DAILY 3/7/24  Yes Rigo Shukla,    raloxifene (EVISTA) 60 MG tablet Take 1 tablet by mouth daily 24  Yes Rigo Shukla,    sulfamethoxazole-trimethoprim (BACTRIM DS;SEPTRA DS) 800-160 MG per tablet Take 1 tablet by mouth 2 times daily for 10 days 24  Byron Cooper MD   aspirin (ASPIRIN 81) 81 MG EC tablet Take 1 tablet by mouth in the morning and at bedtime Take one tablet morning and evening 24   Byron Cooper MD   gabapentin (NEURONTIN) 100 MG capsule Take 1 capsule by mouth 2 times daily for 15 days. 24  Byron Cooper MD   meloxicam (MOBIC) 15 MG tablet Take 1 tablet by mouth daily 24   Byron Cooper MD   omeprazole (PRILOSEC) 40 MG delayed release capsule Take 1 capsule by mouth daily 24   Byron Cooper MD   ondansetron (ZOFRAN) 4 MG tablet Take 1 tablet by mouth 3 times daily as needed for Nausea or Vomiting 24   Byron Cooper MD   senna (SENOKOT) 8.6 MG tablet Take 1 tablet by mouth 2 times daily 24   Byron Cooper MD   tiZANidine (ZANAFLEX) 4 MG tablet Take 1 tablet by mouth every 8 hours as needed (spasm) 24   Byron Cooper MD   traZODone (DESYREL) 50 MG tablet 1-2 30 minutes prior to sleep 24   Rigo Shukla, DO

## 2024-08-14 NOTE — ANESTHESIA PROCEDURE NOTES
Spinal Block    Patient location during procedure: OR  End time: 8/14/2024 7:05 AM  Reason for block: primary anesthetic  Staffing  Performed: anesthesiologist   Anesthesiologist: Duke Beard MD  Performed by: Duke Beard MD  Authorized by: Duke Beard MD    Spinal Block  Patient position: sitting  Prep: ChloraPrep  Patient monitoring: cardiac monitor, continuous pulse ox, continuous capnometry, frequent blood pressure checks and oxygen  Approach: midline  Location: L3/L4  Provider prep: sterile gloves and mask  Needle  Needle type: pencil-tip   Needle gauge: 25 G  Needle length: 3.5 in  Assessment  Sensory level: T4  Events: cerebrospinal fluid  Swirl obtained: Yes  CSF: clear  Attempts: 1  Hemodynamics: stable  Preanesthetic Checklist  Completed: patient identified, IV checked, site marked, risks and benefits discussed, surgical/procedural consents, equipment checked, pre-op evaluation, timeout performed, anesthesia consent given, oxygen available and monitors applied/VS acknowledged

## 2024-08-14 NOTE — PERIOP NOTE
TRANSFER - OUT REPORT:    Verbal report given to [place] RN on Josey Black  being transferred to [facility] for routine progression of patient care       Report consisted of patient's Situation, Background, Assessment and   Recommendations(SBAR).     Information from the following report(s) Nurse Handoff Report, Adult Overview, and MAR was reviewed with the receiving nurse.           Lines:   Peripheral IV 08/14/24 Distal;Left;Posterior Forearm (Active)   Site Assessment Clean, dry & intact 08/14/24 0835   Line Status Infusing 08/14/24 0835   Line Care Connections checked and tightened 08/14/24 0835   Phlebitis Assessment No symptoms 08/14/24 0835   Infiltration Assessment 0 08/14/24 0835   Dressing Status Clean, dry & intact 08/14/24 0835   Dressing Type Transparent 08/14/24 0835        Opportunity for questions and clarification was provided.      Patient transported with:  O2 @ 0lpm

## 2024-08-14 NOTE — PROGRESS NOTES
OCCUPATIONAL THERAPY Initial Assessment and PM      (Link to Caseload Tracking: OT Visit Days: 1  OT Orders   Time  OT Charge Capture  Rehab Caseload Tracker  Episode     Josey Black is a 74 y.o. female   PRIMARY DIAGNOSIS: Osteoarthritis of right knee, unspecified osteoarthritis type  Primary osteoarthritis of right knee [M17.11]  Acquired deformity of right knee [M21.961]  Osteoarthritis of right knee, unspecified osteoarthritis type [M17.11]  Procedure(s) (LRB):  SDD - RIGHT KNEE TOTAL ARTHROPLASTY ROBOTIC, [place]/VIKTORIYA (Right)  Day of Surgery  Reason for Referral: Pain in Right Knee (M25.561)  Stiffness of Right Knee, Not elsewhere classified (M25.661)  Outpatient in a bed: Payor: MEDICARE / Plan: MEDICARE PART A AND B / Product Type: *No Product type* /     ASSESSMENT:     REHAB RECOMMENDATIONS:   Recommendation to date pending progress:  Setting:  No further skilled occupational therapy after discharge from hospital    Equipment:    To Be Determined     ASSESSMENT:  Ms. Black is s/p right TKA and presents with decreased independence with functional mobility and activities of daily living as compared to baseline level of function and safety. Patient would benefit from skilled Occupational Therapy to maximize independence and safety with self-care task and functional mobility.   Patient able to complete  dressing, lower body dressing, upper body dressing, and toileting at bathroom with minimal assist .  Mobilized from bathroom to hallway using a rolling walker with assist. Patient is hopeful to return home day of surgery       [facility] AM-PAC™ “6 Clicks” Daily Activity Inpatient Short Form:     AM-PAC Daily Activity - Inpatient   How much help is needed for putting on and taking off regular lower body clothing?: A Little  How much help is needed for bathing (which includes washing, rinsing, drying)?: A Little  How much help is needed for toileting (which includes using toilet, bedpan, or

## 2024-08-15 ENCOUNTER — TELEPHONE (OUTPATIENT)
Dept: ORTHOPEDIC SURGERY | Age: 74
End: 2024-08-15

## 2024-08-15 VITALS
OXYGEN SATURATION: 100 % | HEART RATE: 80 BPM | BODY MASS INDEX: 26.41 KG/M2 | WEIGHT: 134.5 LBS | RESPIRATION RATE: 16 BRPM | HEIGHT: 60 IN | DIASTOLIC BLOOD PRESSURE: 68 MMHG | TEMPERATURE: 98.8 F | SYSTOLIC BLOOD PRESSURE: 119 MMHG

## 2024-08-15 DIAGNOSIS — G89.18 POSTOPERATIVE PAIN: Primary | ICD-10-CM

## 2024-08-15 LAB
HCT VFR BLD AUTO: 28.1 % (ref 35.8–46.3)
HGB BLD-MCNC: 9.2 G/DL (ref 11.7–15.4)

## 2024-08-15 PROCEDURE — 97530 THERAPEUTIC ACTIVITIES: CPT

## 2024-08-15 PROCEDURE — 85018 HEMOGLOBIN: CPT

## 2024-08-15 PROCEDURE — 85014 HEMATOCRIT: CPT

## 2024-08-15 PROCEDURE — 6370000000 HC RX 637 (ALT 250 FOR IP): Performed by: ORTHOPAEDIC SURGERY

## 2024-08-15 PROCEDURE — 99024 POSTOP FOLLOW-UP VISIT: CPT | Performed by: NURSE PRACTITIONER

## 2024-08-15 PROCEDURE — 6370000000 HC RX 637 (ALT 250 FOR IP): Performed by: NURSE PRACTITIONER

## 2024-08-15 PROCEDURE — 36415 COLL VENOUS BLD VENIPUNCTURE: CPT

## 2024-08-15 RX ORDER — TRAMADOL HYDROCHLORIDE 50 MG/1
50 TABLET ORAL EVERY 4 HOURS PRN
Qty: 42 TABLET | Refills: 0 | Status: SHIPPED | OUTPATIENT
Start: 2024-08-15 | End: 2024-08-22

## 2024-08-15 RX ADMIN — LOSARTAN POTASSIUM 100 MG: 50 TABLET, FILM COATED ORAL at 09:08

## 2024-08-15 RX ADMIN — ASPIRIN 81 MG: 81 TABLET, COATED ORAL at 09:08

## 2024-08-15 RX ADMIN — HYDROCHLOROTHIAZIDE 12.5 MG: 25 TABLET ORAL at 09:08

## 2024-08-15 RX ADMIN — ACETAMINOPHEN 1000 MG: 500 TABLET, FILM COATED ORAL at 05:37

## 2024-08-15 RX ADMIN — TRAMADOL HYDROCHLORIDE 50 MG: 50 TABLET ORAL at 09:12

## 2024-08-15 RX ADMIN — GABAPENTIN 100 MG: 100 CAPSULE ORAL at 09:08

## 2024-08-15 RX ADMIN — SENNOSIDES AND DOCUSATE SODIUM 1 TABLET: 50; 8.6 TABLET ORAL at 09:08

## 2024-08-15 RX ADMIN — PANTOPRAZOLE SODIUM 40 MG: 40 TABLET, DELAYED RELEASE ORAL at 05:37

## 2024-08-15 ASSESSMENT — PAIN DESCRIPTION - DESCRIPTORS: DESCRIPTORS: ACHING

## 2024-08-15 ASSESSMENT — PAIN DESCRIPTION - ORIENTATION: ORIENTATION: RIGHT

## 2024-08-15 ASSESSMENT — PAIN SCALES - GENERAL
PAINLEVEL_OUTOF10: 1
PAINLEVEL_OUTOF10: 3

## 2024-08-15 ASSESSMENT — PAIN SCALES - WONG BAKER: WONGBAKER_NUMERICALRESPONSE: NO HURT

## 2024-08-15 ASSESSMENT — PAIN - FUNCTIONAL ASSESSMENT: PAIN_FUNCTIONAL_ASSESSMENT: ACTIVITIES ARE NOT PREVENTED

## 2024-08-15 ASSESSMENT — PAIN DESCRIPTION - LOCATION: LOCATION: KNEE

## 2024-08-15 NOTE — PROGRESS NOTES
08/14/24 2121   Treatment   Treatment Type IS   Oxygen Therapy/Pulse Ox   O2 Device None (Room air)   Pulse 77   Respirations 18   SpO2 97 %   Pulse Oximeter Device Mode Continuous   Pulse Oximeter Device Location Right;Finger   Pulse Oximeter Low SpO2 Alarm 89   Pulse Oximeter High SpO2 Alarm 100   $Pulse Oximeter $Spot check (multiple/continuous)   Incentive Spirometry Tx   Treatment Effort Assisted by RT   Achieved Volume (mL) 1500 mL     Patient placed on continuous sat monitor with alarms set per protocol. (%).  Monitor history deleted prior to placing on patient. Patient working on IS achieving 1500 ml. Very good effort, technique.

## 2024-08-15 NOTE — PROGRESS NOTES
Occupational Therapy Note:    Attempted to see patient this AM for occupational therapy treatment  ADL session. Patient stated she would shower at home. Pt had already be up with PT and brushed her teeth. Pt declined any further OT this am.  Will follow and re-attempt as schedule permits/patient available. Thank you,    URIEL HOPPER, OT    Rehab Caseload Tracker

## 2024-08-15 NOTE — PROGRESS NOTES
ACUTE PHYSICAL THERAPY GOALS:   (Developed with and agreed upon by patient and/or caregiver.)  GOALS (1-4 days):  (1.)Ms. Black will move from supine to sit and sit to supine  in bed with SUPERVISION.  (2.)Ms. Black will transfer from bed to chair and chair to bed with SUPERVISION using the least restrictive device.  (3.)Ms. Black will ambulate with SUPERVISION for 200 feet with the least restrictive device.  (4.)Ms. Black will ambulate up/down 1 steps with bilateral  railing or with Rw with MINIMAL ASSIST.met  (5.)Ms. Black will increase right knee ROM to 3-90°.  ________________________________________________________________________________________________            PHYSICAL THERAPY: TOTAL KNEE ARTHROPLASTY Daily Note and AM  (Link to Caseload Tracking: PT Visit Days : 2  Acknowledge Orders  Time In/Out  PT Charge Capture  Rehab Caseload Tracker  Episode   Josey Black is a 74 y.o. female   PRIMARY DIAGNOSIS: Osteoarthritis of right knee, unspecified osteoarthritis type  Primary osteoarthritis of right knee [M17.11]  Acquired deformity of right knee [M21.961]  Osteoarthritis of right knee, unspecified osteoarthritis type [M17.11]  Procedure(s) (LRB):  SDD - RIGHT KNEE TOTAL ARTHROPLASTY ROBOTIC, [place]/VIKTORIYA (Right)  1 Day Post-Op  Reason for Referral: Pain in Right Knee (M25.561)  Stiffness of Right Knee, Not elsewhere classified (M25.661)  Difficulty in walking, Not elsewhere classified (R26.2)  Outpatient in a bed: Payor: MEDICARE / Plan: MEDICARE PART A AND B / Product Type: *No Product type* /     REHAB RECOMMENDATIONS:   Recommendation to date pending progress:  Setting:  Home Health Therapy    Equipment:     Has RW     RANGE OF MOTION:   Right Knee Flexion: R Knee Flexion (0-145): 80  Right Knee Extension: R Knee Extension (0): 5     GAIT: I Mod I S SBA CGA Min Mod Max Total  NT x2 Comments:   Level of Assistance [] [] [] [x] [] [] [] [] [] [] []            Weightbearing Status  Right

## 2024-08-15 NOTE — PROGRESS NOTES
August 15, 2024         Post Op day: 1 Day Post-Op     Admit Date: 2024    Admit Diagnosis: Primary osteoarthritis of right knee [M17.11]  Acquired deformity of right knee [M21.961]  Osteoarthritis of right knee, unspecified osteoarthritis type [M17.11]        Subjective: Patient stable.  No acute events.      Objective:     Vitals:    08/15/24 0749   BP: 119/68   Pulse: 80   Resp: 16   Temp: 98.8 °F (37.1 °C)   SpO2: 100%    Temp (24hrs), Av.1 °F (36.7 °C), Min:97.7 °F (36.5 °C), Max:98.8 °F (37.1 °C)      Lab Results   Component Value Date/Time    HGB 9.2 08/15/2024 03:45 AM       Patient Active Problem List   Diagnosis    Insomnia    Osteopenia    Restless leg syndrome    Primary osteoarthritis of right knee    Acquired deformity of right knee    Osteoarthritis of right knee, unspecified osteoarthritis type       Current Facility-Administered Medications   Medication Dose Route Frequency    albuterol (PROVENTIL) (2.5 MG/3ML) 0.083% nebulizer solution 2.5 mg  2.5 mg Nebulization Q6H PRN    gabapentin (NEURONTIN) capsule 100 mg  100 mg Oral BID    raloxifene (EVISTA) tablet 60 mg (PATIENT SUPPLIED) (Patient Supplied)  60 mg Oral Daily    traZODone (DESYREL) tablet 50 mg  50 mg Oral Nightly    0.9 % sodium chloride infusion   IntraVENous Continuous    sodium chloride flush 0.9 % injection 5-40 mL  5-40 mL IntraVENous 2 times per day    sodium chloride flush 0.9 % injection 5-40 mL  5-40 mL IntraVENous PRN    0.9 % sodium chloride infusion   IntraVENous PRN    acetaminophen (TYLENOL) tablet 1,000 mg  1,000 mg Oral Q6H    HYDROmorphone HCl PF (DILAUDID) injection 1 mg  1 mg IntraVENous Q3H PRN    ondansetron (ZOFRAN-ODT) disintegrating tablet 4 mg  4 mg Oral Q8H PRN    Or    ondansetron (ZOFRAN) injection 4 mg  4 mg IntraVENous Q6H PRN    sennosides-docusate sodium (SENOKOT-S) 8.6-50 MG tablet 1 tablet  1 tablet Oral BID    aspirin EC tablet 81 mg  81 mg Oral BID    diphenhydrAMINE (BENADRYL) capsule 25

## 2024-08-19 ENCOUNTER — TELEPHONE (OUTPATIENT)
Dept: ORTHOPEDIC SURGERY | Age: 74
End: 2024-08-19

## 2024-08-19 DIAGNOSIS — Z96.651 STATUS POST RIGHT KNEE REPLACEMENT: Primary | ICD-10-CM

## 2024-08-19 NOTE — TELEPHONE ENCOUNTER
She left a voicemail stating she has an appt at [facility] on Sept 18 for outpt therapy. Please send them an order,

## 2024-08-19 NOTE — TELEPHONE ENCOUNTER
She is wanting to go to [facility] for outpatient PT but they are waiting on an order to be put in the system.

## 2024-08-31 DIAGNOSIS — R06.83 SNORING: Primary | ICD-10-CM

## 2024-08-31 NOTE — PROGRESS NOTES
Ref by Stella Lugo NP for snoring, witnessed apnea, excessive daytime sleepiness.   She has not had a study. She is agreeable to a study after she is able to move around a little better.I let her know the process takes several weeks. She agrees to proceed with scheduling.

## 2024-09-05 ENCOUNTER — OFFICE VISIT (OUTPATIENT)
Dept: ORTHOPEDIC SURGERY | Age: 74
End: 2024-09-05

## 2024-09-05 DIAGNOSIS — M25.561 RIGHT KNEE PAIN, UNSPECIFIED CHRONICITY: Primary | ICD-10-CM

## 2024-09-05 DIAGNOSIS — Z09 FOLLOW-UP EXAMINATION FOLLOWING SURGERY: ICD-10-CM

## 2024-09-05 PROCEDURE — 99024 POSTOP FOLLOW-UP VISIT: CPT | Performed by: ORTHOPAEDIC SURGERY

## 2024-09-05 RX ORDER — AMOXICILLIN 500 MG/1
TABLET, FILM COATED ORAL
Qty: 12 TABLET | Refills: 1 | Status: SHIPPED | OUTPATIENT
Start: 2024-09-05

## 2024-09-05 RX ORDER — TRAMADOL HYDROCHLORIDE 50 MG/1
50 TABLET ORAL EVERY 4 HOURS
Qty: 42 TABLET | Refills: 0 | Status: SHIPPED | OUTPATIENT
Start: 2024-09-05 | End: 2024-09-12

## 2024-09-05 NOTE — PROGRESS NOTES
Patient ID:  Josey Black  [id number]  74 y.o.  1950    Today: September 5, 2024    CHIEF COMPLAINT:  Follow-up right total knee replacement    HISTORY:  The patient presents today for 3-week follow-up after knee replacement.  The patient is doing very well, is on aspirin for DVT prophylaxis.  The patient is working with physical therapy to regain some strength and motion.  Continues to take medication appropriately.  The patient has done a good job keeping dressing/wound clean and dry.  The patient is progressing nicely postoperatively.    PE:  Incision is examined which is well healed.  No erythema, induration or drainage. No significant fluid accumulation around the surgical site.  ROM is 0 to 115 degrees.  Overall the knee appears stable to varus/valgus stress throughout arc of motion.  Anterior drawer testing at 45 and 90º of flexion is stable.  Posterior drawer testing is stable.  Distally able to plantar and dorsiflex foot and ankle.  Sensation intact.  Limb is perfused.  No sign of DVT.    X-RAYS:    XR RT Knee 2/3 view  Views Obtained: AP, Lateral and Sunrise views of the right knee  Indication: Postop Right TKA  Findings: All hardware to be intact.  All the components appear to be well sized without any evidence of loosening.  Overall mechanical alignment appears to be within acceptable criteria. No evidence of fracture.  Patella appears to be tracking appropriately within the trochlear groove.  Impression: Normal Xray after right total knee replacement    PEACE SIBLEY, APRN - CNP    ASSESSMENT:  3 Weeks S/P Right Total Knee Replacement    PLAN:  Continue activity and weight bearing as tolerated.  Continue PT/OT to focus on strengthening and stretching.  Continue to take pain medication appropriately.  The patient will continue to take aspirin for another week for a full month of DVT prophylaxis.  The patient is given a script for antibiotics to be taken before dental prophylaxis today.

## 2024-09-06 DIAGNOSIS — G25.81 RESTLESS LEG SYNDROME: ICD-10-CM

## 2024-09-06 DIAGNOSIS — M85.80 OSTEOPENIA, UNSPECIFIED LOCATION: ICD-10-CM

## 2024-09-06 DIAGNOSIS — I10 PRIMARY HYPERTENSION: ICD-10-CM

## 2024-09-09 ENCOUNTER — PATIENT MESSAGE (OUTPATIENT)
Dept: INTERNAL MEDICINE CLINIC | Facility: CLINIC | Age: 74
End: 2024-09-09

## 2024-09-09 RX ORDER — IRBESARTAN AND HYDROCHLOROTHIAZIDE 300; 12.5 MG/1; MG/1
1 TABLET, FILM COATED ORAL DAILY
Qty: 90 TABLET | Refills: 3 | Status: SHIPPED | OUTPATIENT
Start: 2024-09-09

## 2024-09-09 RX ORDER — RALOXIFENE HYDROCHLORIDE 60 MG/1
60 TABLET, FILM COATED ORAL DAILY
Qty: 90 TABLET | Refills: 2 | Status: SHIPPED | OUTPATIENT
Start: 2024-09-09

## 2024-09-09 RX ORDER — TRAZODONE HYDROCHLORIDE 50 MG/1
TABLET, FILM COATED ORAL
Qty: 90 TABLET | Refills: 1 | Status: SHIPPED | OUTPATIENT
Start: 2024-09-09

## 2024-09-11 ENCOUNTER — HOSPITAL ENCOUNTER (OUTPATIENT)
Dept: PHYSICAL THERAPY | Age: 74
Setting detail: RECURRING SERIES
Discharge: HOME | End: 2024-09-14
Attending: ORTHOPAEDIC SURGERY
Payer: MEDICARE

## 2024-09-11 DIAGNOSIS — R26.89 OTHER ABNORMALITIES OF GAIT AND MOBILITY: ICD-10-CM

## 2024-09-11 DIAGNOSIS — M62.81 MUSCLE WEAKNESS (GENERALIZED): ICD-10-CM

## 2024-09-11 DIAGNOSIS — M25.561 ACUTE PAIN OF RIGHT KNEE: Primary | ICD-10-CM

## 2024-09-11 DIAGNOSIS — M25.661 STIFFNESS OF KNEE JOINT, RIGHT: ICD-10-CM

## 2024-09-11 PROCEDURE — 97161 PT EVAL LOW COMPLEX 20 MIN: CPT

## 2024-09-11 PROCEDURE — 97110 THERAPEUTIC EXERCISES: CPT

## 2024-09-11 ASSESSMENT — PAIN SCALES - GENERAL: PAINLEVEL_OUTOF10: 1

## 2024-09-12 ENCOUNTER — HOSPITAL ENCOUNTER (OUTPATIENT)
Dept: PHYSICAL THERAPY | Age: 74
Setting detail: RECURRING SERIES
Discharge: HOME | End: 2024-09-15
Attending: ORTHOPAEDIC SURGERY
Payer: MEDICARE

## 2024-09-12 PROCEDURE — 97110 THERAPEUTIC EXERCISES: CPT

## 2024-09-18 ENCOUNTER — HOSPITAL ENCOUNTER (OUTPATIENT)
Dept: PHYSICAL THERAPY | Age: 74
Setting detail: RECURRING SERIES
Discharge: HOME | End: 2024-09-21
Attending: ORTHOPAEDIC SURGERY
Payer: MEDICARE

## 2024-09-18 PROCEDURE — 97113 AQUATIC THERAPY/EXERCISES: CPT

## 2024-09-19 ENCOUNTER — HOSPITAL ENCOUNTER (OUTPATIENT)
Dept: PHYSICAL THERAPY | Age: 74
Setting detail: RECURRING SERIES
Discharge: HOME | End: 2024-09-22
Attending: ORTHOPAEDIC SURGERY
Payer: MEDICARE

## 2024-09-19 PROCEDURE — 97113 AQUATIC THERAPY/EXERCISES: CPT

## 2024-09-23 ENCOUNTER — HOSPITAL ENCOUNTER (OUTPATIENT)
Dept: PHYSICAL THERAPY | Age: 74
Setting detail: RECURRING SERIES
Discharge: HOME | End: 2024-09-26
Attending: ORTHOPAEDIC SURGERY
Payer: MEDICARE

## 2024-09-23 PROCEDURE — 97113 AQUATIC THERAPY/EXERCISES: CPT

## 2024-09-25 ENCOUNTER — HOSPITAL ENCOUNTER (OUTPATIENT)
Dept: PHYSICAL THERAPY | Age: 74
Setting detail: RECURRING SERIES
Discharge: HOME | End: 2024-09-28
Attending: ORTHOPAEDIC SURGERY
Payer: MEDICARE

## 2024-09-25 PROCEDURE — 97113 AQUATIC THERAPY/EXERCISES: CPT

## 2024-10-01 ENCOUNTER — APPOINTMENT (OUTPATIENT)
Dept: PHYSICAL THERAPY | Age: 74
End: 2024-10-01
Attending: ORTHOPAEDIC SURGERY
Payer: MEDICARE

## 2024-10-02 RX ORDER — ONDANSETRON 4 MG/1
4 TABLET, FILM COATED ORAL 3 TIMES DAILY PRN
Qty: 30 TABLET | Refills: 1 | Status: SHIPPED | OUTPATIENT
Start: 2024-10-02

## 2024-10-03 ENCOUNTER — APPOINTMENT (OUTPATIENT)
Dept: PHYSICAL THERAPY | Age: 74
End: 2024-10-03
Attending: ORTHOPAEDIC SURGERY
Payer: MEDICARE

## 2024-10-08 ENCOUNTER — HOSPITAL ENCOUNTER (OUTPATIENT)
Dept: PHYSICAL THERAPY | Age: 74
Setting detail: RECURRING SERIES
Discharge: HOME | End: 2024-10-11
Attending: ORTHOPAEDIC SURGERY
Payer: MEDICARE

## 2024-10-08 PROCEDURE — 97113 AQUATIC THERAPY/EXERCISES: CPT

## 2024-10-08 NOTE — PROGRESS NOTES
Josey Black  : 1950  Primary: Medicare Part A And B (Medicare)  Secondary: [facility] [employer] MEDICARE SUPP [facility] [place]  [address]  [facility] [address]  Phone: [phone]  Fax: [phone] Plan Frequency: 2 times/week    Plan of Care/Certification Expiration Date: 11/10/24        Plan of Care/Certification Expiration Date:  Plan of Care/Certification Expiration Date: 11/10/24    Frequency/Duration:   Plan Frequency: 2 times/week      Time In/Out:   Time In: 1040  Time Out: 1140      PT Visit Info:         Visit Count:  7    OUTPATIENT PHYSICAL THERAPY:   Treatment Note 10/8/2024       Episode  (R TKA)               Treatment Diagnosis:    Acute pain of right knee  Stiffness of knee joint, right  Muscle weakness (generalized)  Other abnormalities of gait and mobility    Goals: (Goals have been discussed and agreed upon with patient.)  Short-Term Functional Goals: Time Frame: 4 weeks  Pt to report compliance with HEP  Pt to restore 120 degrees knee flex for uncompensated mechanics  Discharge Goals: Time Frame: 8 weeks  Pt to increase strength for sit to stand x 30 reps uncompensated  Pt to increase strength for reciprocal gait on stairs uncompensated  Pt to increase SLS > 30 sec B for safe amb all surfaces  Pt to resume walking for ex    Medical/Referring Diagnosis:    Status post right knee replacement [Z96.651]    Referring Physician:  Byron Cooper MD MD Orders:  PT Eval and Treat   Return MD Appt:  24   Date of Onset:  Onset Date: 24     Allergies:   Patient has no known allergies.  Restrictions/Precautions:   None      Interventions Planned (Treatment may consist of any combination of the following):   Balance Training, Endurance Training, Functional Mobility Training, Gait Training, Home Exercise Program (HEP), Manual Therapy, Pain Management, Range of Motion (ROM), Therapeutic Exercise/Strengthening, and Aquatic Therapy     Subjective

## 2024-10-10 ENCOUNTER — HOSPITAL ENCOUNTER (OUTPATIENT)
Dept: PHYSICAL THERAPY | Age: 74
Setting detail: RECURRING SERIES
Discharge: HOME | End: 2024-10-13
Attending: ORTHOPAEDIC SURGERY
Payer: MEDICARE

## 2024-10-10 PROCEDURE — 97113 AQUATIC THERAPY/EXERCISES: CPT

## 2024-10-10 NOTE — PROGRESS NOTES
Therapeutic Exercise/Strengthening, and Aquatic Therapy     Subjective Comments:  I am doing better going up stairs but going down is still tough.  I don't really have pain, it is just stiffness.  Initial Pain Level:: 0/10  Post Session Pain Level:  no increased pain with ex's  Medications Last Reviewed:  10/10/2024  Updated Objective Findings:  10/10/24     ROM               Knee flex-ext L 150-5                        R 120-10 before      Functional Mobility, Balance, and Gait   Stairs - reciprocal but antalgic descending   SLS - 18 sec L, 7 sec R   Sit to stand - x 30   Gait - WNL     Outcome Measure:   Tool Used: Knee injury and Osteoarthritis Outcome Score for Joint Replacement (KOOS, JR)  Score:  Initial: 9 (Interval: 63.776) 9/11/2024 Most Recent: 2(Interval:84.600) 10/10/24   Interpretation of Score:  The KOOS, JR contains 7 items from the original KOOS survey. Items are coded from 0 to 4, none to extreme respectively.   KOOS, JR is scored by summing the raw response (range 0-28) and then converting it to an interval score using the table provided below. The interval score ranges from 0 to 100 where 0 represents total knee disability and 100 represents perfect knee health.  Treatment   THERAPEUTIC ACTIVITY: ( see below for minutes):  Therapeutic activities per grid below to improve mobility.  Required moderate verbal and manual cues to improve functional mobility .  THERAPEUTIC EXERCISE: (see below for minutes):  Exercises per grid below to improve strength.  Required moderate verbal and manual cues to promote proper body alignment, promote proper body posture, promote proper body mechanics and promote proper body breathing techniques.  Progressed resistance, range, repetitions and complexity of movement as indicated.  MANUAL THERAPY: (see below for minutes): Joint mobilization and Soft tissue mobilization was utilized and necessary because of the patient's restricted joint motion, painful spasm, loss of

## 2024-10-15 ENCOUNTER — HOSPITAL ENCOUNTER (OUTPATIENT)
Dept: PHYSICAL THERAPY | Age: 74
Setting detail: RECURRING SERIES
Discharge: HOME | End: 2024-10-18
Attending: ORTHOPAEDIC SURGERY
Payer: MEDICARE

## 2024-10-15 PROCEDURE — 97113 AQUATIC THERAPY/EXERCISES: CPT

## 2024-10-15 NOTE — PROGRESS NOTES
Josey Black  : 1950  Primary: Medicare Part A And B (Medicare)  Secondary: [facility] [employer] INS MEDICARE SUPP [facility] [place] [address] [address]  [facility] [address]  Phone: [phone]  Fax: [phone] Plan Frequency: 2 times/week    Plan of Care/Certification Expiration Date: 11/10/24        Plan of Care/Certification Expiration Date:  Plan of Care/Certification Expiration Date: 11/10/24    Frequency/Duration:   Plan Frequency: 2 times/week      Time In/Out:   Time In: 20  Time Out: 1041      PT Visit Info:         Visit Count:  9    OUTPATIENT PHYSICAL THERAPY:   Treatment Note  10/15/2024       Episode  (R TKA)               Treatment Diagnosis:    Acute pain of right knee  Stiffness of knee joint, right  Muscle weakness (generalized)  Other abnormalities of gait and mobility    Goals: (Goals have been discussed and agreed upon with patient.)  Short-Term Functional Goals: Time Frame: 4 weeks  Pt to report compliance with HEP - MET  Pt to restore 120 degrees knee flex for uncompensated mechanics - MET  Discharge Goals: Time Frame: 8 weeks  Pt to increase strength for sit to stand x 30 reps uncompensated - MET  Pt to increase strength for reciprocal gait on stairs uncompensated - ongoing  Pt to increase SLS > 30 sec B for safe amb all surfaces - ongoing  Pt to resume walking for ex - ongoing    Medical/Referring Diagnosis:    Status post right knee replacement [Z96.651]    Referring Physician:  Byron Cooper MD MD Orders:  PT Eval and Treat   Return MD Appt:  24   Date of Onset:  Onset Date: 24     Allergies:   Patient has no known allergies.  Restrictions/Precautions:   None      Interventions Planned (Treatment may consist of any combination of the following):   Balance Training, Endurance Training, Functional Mobility Training, Gait Training, Home Exercise Program (HEP), Manual Therapy, Pain Management, Range of Motion (ROM), Therapeutic

## 2024-10-17 ENCOUNTER — HOSPITAL ENCOUNTER (OUTPATIENT)
Dept: PHYSICAL THERAPY | Age: 74
Setting detail: RECURRING SERIES
Discharge: HOME | End: 2024-10-20
Attending: ORTHOPAEDIC SURGERY
Payer: MEDICARE

## 2024-10-17 PROCEDURE — 97113 AQUATIC THERAPY/EXERCISES: CPT

## 2024-10-17 NOTE — PROGRESS NOTES
Josey Black  : 1950  Primary: Medicare Part A And B (Medicare)  Secondary: [facility] [employer] MEDICARE SUPP [facility] [place] [address] [address]  [facility] [address]  Phone: [phone]  Fax: [phone] Plan Frequency: 2 times/week    Plan of Care/Certification Expiration Date: 11/10/24        Plan of Care/Certification Expiration Date:  Plan of Care/Certification Expiration Date: 11/10/24    Frequency/Duration:   Plan Frequency: 2 times/week      Time In/Out:   Time In: 1000  Time Out: 1055      PT Visit Info:         Visit Count:  10    OUTPATIENT PHYSICAL THERAPY:   Treatment Note  10/17/2024       Episode  (R TKA)               Treatment Diagnosis:    Acute pain of right knee  Stiffness of knee joint, right  Muscle weakness (generalized)  Other abnormalities of gait and mobility    Goals: (Goals have been discussed and agreed upon with patient.)  Short-Term Functional Goals: Time Frame: 4 weeks  Pt to report compliance with HEP - MET  Pt to restore 120 degrees knee flex for uncompensated mechanics - MET  Discharge Goals: Time Frame: 8 weeks  Pt to increase strength for sit to stand x 30 reps uncompensated - MET  Pt to increase strength for reciprocal gait on stairs uncompensated - ongoing  Pt to increase SLS > 30 sec B for safe amb all surfaces - ongoing  Pt to resume walking for ex - ongoing    Medical/Referring Diagnosis:    Status post right knee replacement [Z96.651]    Referring Physician:  Byron Cooper MD MD Orders:  PT Eval and Treat   Return MD Appt:  24   Date of Onset:  Onset Date: 24     Allergies:   Patient has no known allergies.  Restrictions/Precautions:   None      Interventions Planned (Treatment may consist of any combination of the following):   Balance Training, Endurance Training, Functional Mobility Training, Gait Training, Home Exercise Program (HEP), Manual Therapy, Pain Management, Range of Motion (ROM), Therapeutic

## 2024-10-22 ENCOUNTER — HOSPITAL ENCOUNTER (OUTPATIENT)
Dept: PHYSICAL THERAPY | Age: 74
Setting detail: RECURRING SERIES
Discharge: HOME | End: 2024-10-25
Attending: ORTHOPAEDIC SURGERY
Payer: MEDICARE

## 2024-10-22 PROCEDURE — 97113 AQUATIC THERAPY/EXERCISES: CPT

## 2024-10-22 NOTE — PROGRESS NOTES
Josey Black  : 1950  Primary: Medicare Part A And B (Medicare)  Secondary: [facility] [employer] MEDICARE SUPP [facility] [place] [address] [address]  [facility] [address]  Phone: [phone]  Fax: [phone] Plan Frequency: 2 times/week    Plan of Care/Certification Expiration Date: 11/10/24        Plan of Care/Certification Expiration Date:  Plan of Care/Certification Expiration Date: 11/10/24    Frequency/Duration:   Plan Frequency: 2 times/week      Time In/Out:   Time In: 1015  Time Out: 1123      PT Visit Info:         Visit Count:  11    OUTPATIENT PHYSICAL THERAPY:   Treatment Note  10/22/2024       Episode  (R TKA)               Treatment Diagnosis:    Acute pain of right knee  Stiffness of knee joint, right  Muscle weakness (generalized)  Other abnormalities of gait and mobility    Goals: (Goals have been discussed and agreed upon with patient.)  Short-Term Functional Goals: Time Frame: 4 weeks  Pt to report compliance with HEP - MET  Pt to restore 120 degrees knee flex for uncompensated mechanics - MET  Discharge Goals: Time Frame: 8 weeks  Pt to increase strength for sit to stand x 30 reps uncompensated - MET  Pt to increase strength for reciprocal gait on stairs uncompensated - ongoing  Pt to increase SLS > 30 sec B for safe amb all surfaces - ongoing  Pt to resume walking for ex - ongoing    Medical/Referring Diagnosis:    Status post right knee replacement [Z96.651]    Referring Physician:  Byron Cooper MD MD Orders:  PT Eval and Treat   Return MD Appt:  24   Date of Onset:  Onset Date: 24     Allergies:   Patient has no known allergies.  Restrictions/Precautions:   None      Interventions Planned (Treatment may consist of any combination of the following):   Balance Training, Endurance Training, Functional Mobility Training, Gait Training, Home Exercise Program (HEP), Manual Therapy, Pain Management, Range of Motion (ROM), Therapeutic

## 2024-10-24 ENCOUNTER — HOSPITAL ENCOUNTER (OUTPATIENT)
Dept: PHYSICAL THERAPY | Age: 74
Setting detail: RECURRING SERIES
Discharge: HOME | End: 2024-10-27
Attending: ORTHOPAEDIC SURGERY
Payer: MEDICARE

## 2024-10-24 PROCEDURE — 97113 AQUATIC THERAPY/EXERCISES: CPT

## 2024-10-24 NOTE — PROGRESS NOTES
Josey Black  : 1950  Primary: Medicare Part A And B (Medicare)  Secondary: [facility] [employer] MEDICARE SUPP [facility] @ [place] [address] [address]  [facility] [address]  Phone: [phone]  Fax: [phone] Plan Frequency: 2 times/week    Plan of Care/Certification Expiration Date: 11/10/24        Plan of Care/Certification Expiration Date:  Plan of Care/Certification Expiration Date: 11/10/24    Frequency/Duration:   Plan Frequency: 2 times/week      Time In/Out:   Time In: 1015  Time Out: 1134      PT Visit Info:         Visit Count:  12    OUTPATIENT PHYSICAL THERAPY:   Treatment Note  10/24/2024       Episode  (R TKA)               Treatment Diagnosis:    Acute pain of right knee  Stiffness of knee joint, right  Muscle weakness (generalized)  Other abnormalities of gait and mobility    Goals: (Goals have been discussed and agreed upon with patient.)  Short-Term Functional Goals: Time Frame: 4 weeks  Pt to report compliance with HEP - MET  Pt to restore 120 degrees knee flex for uncompensated mechanics - MET  Discharge Goals: Time Frame: 8 weeks  Pt to increase strength for sit to stand x 30 reps uncompensated - MET  Pt to increase strength for reciprocal gait on stairs uncompensated - ongoing  Pt to increase SLS > 30 sec B for safe amb all surfaces - ongoing  Pt to resume walking for ex - ongoing    Medical/Referring Diagnosis:    Status post right knee replacement [Z96.651]    Referring Physician:  Byron Cooper MD MD Orders:  PT Eval and Treat   Return MD Appt:  24   Date of Onset:  Onset Date: 24     Allergies:   Patient has no known allergies.  Restrictions/Precautions:   None      Interventions Planned (Treatment may consist of any combination of the following):   Balance Training, Endurance Training, Functional Mobility Training, Gait Training, Home Exercise Program (HEP), Manual Therapy, Pain Management, Range of Motion (ROM), Therapeutic

## 2024-10-29 ENCOUNTER — HOSPITAL ENCOUNTER (OUTPATIENT)
Dept: PHYSICAL THERAPY | Age: 74
Setting detail: RECURRING SERIES
Discharge: HOME | End: 2024-11-01
Attending: ORTHOPAEDIC SURGERY
Payer: MEDICARE

## 2024-10-29 PROCEDURE — 97113 AQUATIC THERAPY/EXERCISES: CPT

## 2024-10-29 NOTE — PROGRESS NOTES
Josey Black  : 1950  Primary: Medicare Part A And B (Medicare)  Secondary: [facility] [employer] MEDICARE SUPP [facility] @ [place] [address] [address]  [facility] [address]  Phone: [phone]  Fax: [phone] Plan Frequency: 2 times/week    Plan of Care/Certification Expiration Date: 11/10/24        Plan of Care/Certification Expiration Date:  Plan of Care/Certification Expiration Date: 11/10/24    Frequency/Duration:   Plan Frequency: 2 times/week      Time In/Out:   Time In: 1015  Time Out: 1124      PT Visit Info:         Visit Count:  13    OUTPATIENT PHYSICAL THERAPY:   Treatment Note  10/29/2024       Episode  (R TKA)               Treatment Diagnosis:    Acute pain of right knee  Stiffness of knee joint, right  Muscle weakness (generalized)  Other abnormalities of gait and mobility    Goals: (Goals have been discussed and agreed upon with patient.)  Short-Term Functional Goals: Time Frame: 4 weeks  Pt to report compliance with HEP - MET  Pt to restore 120 degrees knee flex for uncompensated mechanics - MET  Discharge Goals: Time Frame: 8 weeks  Pt to increase strength for sit to stand x 30 reps uncompensated - MET  Pt to increase strength for reciprocal gait on stairs uncompensated - ongoing  Pt to increase SLS > 30 sec B for safe amb all surfaces - ongoing  Pt to resume walking for ex - ongoing    Medical/Referring Diagnosis:    Status post right knee replacement [Z96.651]    Referring Physician:  Byron Cooper MD MD Orders:  PT Eval and Treat   Return MD Appt:  24   Date of Onset:  Onset Date: 24     Allergies:   Patient has no known allergies.  Restrictions/Precautions:   None      Interventions Planned (Treatment may consist of any combination of the following):   Balance Training, Endurance Training, Functional Mobility Training, Gait Training, Home Exercise Program (HEP), Manual Therapy, Pain Management, Range of Motion (ROM), Therapeutic

## 2024-11-05 ENCOUNTER — HOSPITAL ENCOUNTER (OUTPATIENT)
Dept: PHYSICAL THERAPY | Age: 74
Setting detail: RECURRING SERIES
Discharge: HOME | End: 2024-11-08
Attending: ORTHOPAEDIC SURGERY
Payer: MEDICARE

## 2024-11-05 PROCEDURE — 97113 AQUATIC THERAPY/EXERCISES: CPT

## 2024-11-05 NOTE — DISCHARGE SUMMARY
Josey Black  : 1950  Primary: Medicare Part A And B (Medicare)  Secondary: [facility] [employer] MEDICARE SUPP [facility] @ [place]  [address]  [facility] [address]  Phone: [phone]  Fax: [phone] Plan Frequency: 2 times/week    Plan of Care/Certification Expiration Date: 11/10/24        Plan of Care/Certification Expiration Date:  Plan of Care/Certification Expiration Date: 11/10/24    Frequency/Duration:   Plan Frequency: 2 times/week      Time In/Out:   Time In: 1015  Time Out: 1133      PT Visit Info:         Visit Count:  14    OUTPATIENT PHYSICAL THERAPY:   Treatment Note and Discharge Summary 2024       Episode  (R TKA)               Treatment Diagnosis:    Acute pain of right knee  Stiffness of knee joint, right  Muscle weakness (generalized)  Other abnormalities of gait and mobility    Goals: (Goals have been discussed and agreed upon with patient.)  Short-Term Functional Goals: Time Frame: 4 weeks  Pt to report compliance with HEP - MET  Pt to restore 120 degrees knee flex for uncompensated mechanics - MET  Discharge Goals: Time Frame: 8 weeks  Pt to increase strength for sit to stand x 30 reps uncompensated - MET  Pt to increase strength for reciprocal gait on stairs uncompensated - MET  Pt to increase SLS > 30 sec B for safe amb all surfaces - ongoing  Pt to resume walking for ex - MET    Medical/Referring Diagnosis:    Status post right knee replacement [Z96.651]    Referring Physician:  Byron Cooper MD MD Orders:  PT Eval and Treat   Return MD Appt:  24   Date of Onset:  Onset Date: 24     Allergies:   Patient has no known allergies.  Restrictions/Precautions:   None      Interventions Planned (Treatment may consist of any combination of the following):   Balance Training, Endurance Training, Functional Mobility Training, Gait Training, Home Exercise Program (HEP), Manual Therapy, Pain Management, Range of Motion (ROM), Therapeutic

## 2024-12-05 DIAGNOSIS — M85.80 OSTEOPENIA, UNSPECIFIED LOCATION: ICD-10-CM

## 2024-12-05 DIAGNOSIS — I10 PRIMARY HYPERTENSION: ICD-10-CM

## 2024-12-05 DIAGNOSIS — G25.81 RESTLESS LEG SYNDROME: ICD-10-CM

## 2024-12-05 RX ORDER — RALOXIFENE HYDROCHLORIDE 60 MG/1
60 TABLET, FILM COATED ORAL DAILY
Qty: 90 TABLET | Refills: 2 | Status: SHIPPED | OUTPATIENT
Start: 2024-12-05

## 2024-12-05 RX ORDER — TRAZODONE HYDROCHLORIDE 50 MG/1
TABLET, FILM COATED ORAL
Qty: 90 TABLET | Refills: 2 | Status: SHIPPED | OUTPATIENT
Start: 2024-12-05

## 2024-12-05 RX ORDER — IRBESARTAN AND HYDROCHLOROTHIAZIDE 300; 12.5 MG/1; MG/1
1 TABLET, FILM COATED ORAL DAILY
Qty: 90 TABLET | Refills: 2 | Status: SHIPPED | OUTPATIENT
Start: 2024-12-05

## 2024-12-13 ENCOUNTER — OFFICE VISIT (OUTPATIENT)
Dept: ORTHOPEDIC SURGERY | Age: 74
End: 2024-12-13

## 2024-12-13 DIAGNOSIS — Z09 FOLLOW-UP EXAMINATION FOLLOWING SURGERY: Primary | ICD-10-CM

## 2024-12-13 NOTE — PROGRESS NOTES
every 8 hours as needed (spasm) 9/5/24   Hi Ward APRN - CNP   amoxicillin (AMOXIL) 500 MG tablet Take 4 tablets by mouth 1 hour prior to any dental procedure. 9/5/24   Hi Ward APRN - CNP   acetaminophen (TYLENOL) 500 MG tablet Take 2 tablets by mouth every 6 hours as needed for Pain 8/5/24   Byron Cooper MD   aspirin (ASPIRIN 81) 81 MG EC tablet Take 1 tablet by mouth in the morning and at bedtime Take one tablet morning and evening 8/5/24   Byron Cooper MD   gabapentin (NEURONTIN) 100 MG capsule Take 1 capsule by mouth 2 times daily for 15 days. 8/5/24 8/20/24  Byron Cooper MD   meloxicam (MOBIC) 15 MG tablet Take 1 tablet by mouth daily 8/5/24   Byron Cooper MD   senna (SENOKOT) 8.6 MG tablet Take 1 tablet by mouth 2 times daily 8/5/24   Byron Cooper MD   Calcium-Magnesium-Vitamin D (CALCIUM 1200+D3 PO) Take by mouth    ProviderBertrand MD   Cholecalciferol (VITAMIN D3) 50 MCG (2000 UT) CAPS Take by mouth    Provider, MD Bertrand       Family History:     Family History   Problem Relation Age of Onset    Cancer Mother         breast    Hypertension Mother     Breast Cancer Mother 56    Cancer Father         lung    Hypertension Brother     Hypertension Brother     Breast Cancer Maternal Uncle         60?       Social History:      Social History     Tobacco Use    Smoking status: Never    Smokeless tobacco: Never   Substance Use Topics    Alcohol use: Yes     Alcohol/week: 7.0 standard drinks of alcohol     Types: 7 Glasses of wine per week           Allergies:    No Known Allergies       ROS:  Patient Health Form has been filled out, reviewed, signed and included in the chart.  ROS findings related to musculoskeletal problems were discussed with the patient.  Patient advised to discuss non-orthopedic complaints with primary care physician.    PE:  Incision is examined which is well healed.  No erythema, induration or drainage. No significant fluid accumulation

## 2025-01-24 ENCOUNTER — HOSPITAL ENCOUNTER (OUTPATIENT)
Dept: SLEEP CENTER | Age: 75
Discharge: HOME | End: 2025-01-27

## 2025-01-27 ENCOUNTER — TRANSCRIBE ORDERS (OUTPATIENT)
Dept: SCHEDULING | Age: 75
End: 2025-01-27

## 2025-01-27 DIAGNOSIS — Z12.31 OTHER SCREENING MAMMOGRAM: Primary | ICD-10-CM

## 2025-03-14 ENCOUNTER — HOSPITAL ENCOUNTER (OUTPATIENT)
Dept: SLEEP CENTER | Age: 75
Discharge: HOME | End: 2025-03-16
Payer: MEDICARE

## 2025-03-14 PROCEDURE — 95806 SLEEP STUDY UNATT&RESP EFFT: CPT

## 2025-04-02 ENCOUNTER — HOSPITAL ENCOUNTER (OUTPATIENT)
Dept: MAMMOGRAPHY | Age: 75
Discharge: HOME | End: 2025-04-05
Attending: INTERNAL MEDICINE
Payer: MEDICARE

## 2025-04-02 VITALS — BODY MASS INDEX: 26.5 KG/M2 | WEIGHT: 135 LBS | HEIGHT: 60 IN

## 2025-04-02 DIAGNOSIS — Z12.31 OTHER SCREENING MAMMOGRAM: ICD-10-CM

## 2025-04-02 PROCEDURE — 77063 BREAST TOMOSYNTHESIS BI: CPT

## 2025-05-02 ENCOUNTER — TELEPHONE (OUTPATIENT)
Dept: PULMONOLOGY | Age: 75
End: 2025-05-02

## 2025-05-02 ENCOUNTER — TELEPHONE (OUTPATIENT)
Dept: SLEEP MEDICINE | Age: 75
End: 2025-05-02

## 2025-05-12 NOTE — TELEPHONE ENCOUNTER
Comments:     Last Office Visit (last PCP visit):   2/5/2025    Next Visit Date:  Future Appointments   Date Time Provider Department Center   6/9/2025  9:30 AM Deepak Sampson MD [facility]  [facility]       **If hasn't been seen in over a year OR hasn't followed up according to last diabetes/ADHD visit, make appointment for patient before sending refill to provider.    Rx requested:  Requested Prescriptions     Pending Prescriptions Disp Refills    tamsulosin (FLOMAX) 0.4 MG capsule 90 capsule 3     Sig: Take 1 capsule by mouth daily               The pharmacy is calling because the patient was prescribed Bactrim but the patient is on Irbesartan Hydrochlorothiazide and the combination can cause an increase in Potassium. Please let them know if they should still fill it.

## 2025-05-18 ASSESSMENT — SLEEP AND FATIGUE QUESTIONNAIRES
HOW LIKELY ARE YOU TO NOD OFF OR FALL ASLEEP IN A CAR, WHILE STOPPED FOR A FEW MINUTES IN TRAFFIC: WOULD NEVER DOZE
HOW LIKELY ARE YOU TO NOD OFF OR FALL ASLEEP IN A CAR, WHILE STOPPED FOR A FEW MINUTES IN TRAFFIC: WOULD NEVER DOZE
HOW LIKELY ARE YOU TO NOD OFF OR FALL ASLEEP WHEN YOU ARE A PASSENGER IN A CAR FOR AN HOUR WITHOUT A BREAK: WOULD NEVER DOZE
HOW LIKELY ARE YOU TO NOD OFF OR FALL ASLEEP WHILE WATCHING TV: SLIGHT CHANCE OF DOZING
HOW LIKELY ARE YOU TO NOD OFF OR FALL ASLEEP WHILE SITTING AND READING: WOULD NEVER DOZE
HOW LIKELY ARE YOU TO NOD OFF OR FALL ASLEEP WHILE LYING DOWN TO REST IN THE AFTERNOON WHEN CIRCUMSTANCES PERMIT: WOULD NEVER DOZE
HOW LIKELY ARE YOU TO NOD OFF OR FALL ASLEEP WHEN YOU ARE A PASSENGER IN A CAR FOR AN HOUR WITHOUT A BREAK: WOULD NEVER DOZE
HOW LIKELY ARE YOU TO NOD OFF OR FALL ASLEEP WHILE SITTING AND TALKING TO SOMEONE: WOULD NEVER DOZE
HOW LIKELY ARE YOU TO NOD OFF OR FALL ASLEEP WHILE SITTING INACTIVE IN A PUBLIC PLACE: WOULD NEVER DOZE
ESS TOTAL SCORE: 1
HOW LIKELY ARE YOU TO NOD OFF OR FALL ASLEEP WHILE SITTING AND READING: WOULD NEVER DOZE
HOW LIKELY ARE YOU TO NOD OFF OR FALL ASLEEP WHILE SITTING INACTIVE IN A PUBLIC PLACE: WOULD NEVER DOZE
HOW LIKELY ARE YOU TO NOD OFF OR FALL ASLEEP WHILE SITTING QUIETLY AFTER LUNCH WITHOUT ALCOHOL: WOULD NEVER DOZE
HOW LIKELY ARE YOU TO NOD OFF OR FALL ASLEEP WHILE SITTING AND TALKING TO SOMEONE: WOULD NEVER DOZE
HOW LIKELY ARE YOU TO NOD OFF OR FALL ASLEEP WHILE SITTING QUIETLY AFTER LUNCH WITHOUT ALCOHOL: WOULD NEVER DOZE
HOW LIKELY ARE YOU TO NOD OFF OR FALL ASLEEP WHILE WATCHING TV: SLIGHT CHANCE OF DOZING
HOW LIKELY ARE YOU TO NOD OFF OR FALL ASLEEP WHILE LYING DOWN TO REST IN THE AFTERNOON WHEN CIRCUMSTANCES PERMIT: WOULD NEVER DOZE

## 2025-05-19 NOTE — PROGRESS NOTES
after a lunch without alcohol 0   In a car, while stopped for a few minutes in traffic 0   [place] Sleepiness Score 1        Patient-reported         DIAGNOSTIC TESTS/RESULTS  HST: 3/15/25             No data to display                Past Medical History:   Diagnosis Date    Arthritis 2022    Hypertension     controlled with medication    Insomnia     Osteopenia     Prolonged emergence from general anesthesia     Restless leg syndrome          Patient Active Problem List   Diagnosis    Insomnia    Osteopenia    Restless leg syndrome    Primary osteoarthritis of right knee    Acquired deformity of right knee    Osteoarthritis of right knee, unspecified osteoarthritis type          Past Surgical History:   Procedure Laterality Date    CATARACT REMOVAL  01/2019    CHOLECYSTECTOMY      GYN  2005    ovarian cyst    ORTHOPEDIC SURGERY  2003    2 back surgeries( snynovial cyst)    OVARY REMOVAL      unilaeral    TOTAL KNEE ARTHROPLASTY Right 08/14/2024    SDD - RIGHT KNEE TOTAL ARTHROPLASTY ROBOTIC, [place]/VIKTORIYA [place] [facility] [place]         Social History     Socioeconomic History    Marital status:      Spouse name: Richard    Number of children: Not on file    Years of education: Not on file    Highest education level: Not on file   Occupational History    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

## 2025-05-20 ENCOUNTER — OFFICE VISIT (OUTPATIENT)
Dept: SLEEP MEDICINE | Age: 75
End: 2025-05-20
Payer: MEDICARE

## 2025-05-20 VITALS
SYSTOLIC BLOOD PRESSURE: 114 MMHG | WEIGHT: 134 LBS | RESPIRATION RATE: 18 BRPM | BODY MASS INDEX: 26.31 KG/M2 | TEMPERATURE: 97.2 F | HEIGHT: 60 IN | HEART RATE: 83 BPM | OXYGEN SATURATION: 98 % | DIASTOLIC BLOOD PRESSURE: 70 MMHG

## 2025-05-20 DIAGNOSIS — K21.9 GASTROESOPHAGEAL REFLUX DISEASE, UNSPECIFIED WHETHER ESOPHAGITIS PRESENT: ICD-10-CM

## 2025-05-20 DIAGNOSIS — G47.33 OSA (OBSTRUCTIVE SLEEP APNEA): Primary | ICD-10-CM

## 2025-05-20 DIAGNOSIS — I10 ESSENTIAL HYPERTENSION: ICD-10-CM

## 2025-05-20 DIAGNOSIS — R09.02 HYPOXEMIA: ICD-10-CM

## 2025-05-20 PROCEDURE — 1160F RVW MEDS BY RX/DR IN RCRD: CPT | Performed by: INTERNAL MEDICINE

## 2025-05-20 PROCEDURE — G8427 DOCREV CUR MEDS BY ELIG CLIN: HCPCS | Performed by: INTERNAL MEDICINE

## 2025-05-20 PROCEDURE — 3078F DIAST BP <80 MM HG: CPT | Performed by: INTERNAL MEDICINE

## 2025-05-20 PROCEDURE — 1090F PRES/ABSN URINE INCON ASSESS: CPT | Performed by: INTERNAL MEDICINE

## 2025-05-20 PROCEDURE — 3074F SYST BP LT 130 MM HG: CPT | Performed by: INTERNAL MEDICINE

## 2025-05-20 PROCEDURE — 3017F COLORECTAL CA SCREEN DOC REV: CPT | Performed by: INTERNAL MEDICINE

## 2025-05-20 PROCEDURE — G8417 CALC BMI ABV UP PARAM F/U: HCPCS | Performed by: INTERNAL MEDICINE

## 2025-05-20 PROCEDURE — G8399 PT W/DXA RESULTS DOCUMENT: HCPCS | Performed by: INTERNAL MEDICINE

## 2025-05-20 PROCEDURE — 1159F MED LIST DOCD IN RCRD: CPT | Performed by: INTERNAL MEDICINE

## 2025-05-20 PROCEDURE — 1036F TOBACCO NON-USER: CPT | Performed by: INTERNAL MEDICINE

## 2025-05-20 PROCEDURE — 1123F ACP DISCUSS/DSCN MKR DOCD: CPT | Performed by: INTERNAL MEDICINE

## 2025-05-20 PROCEDURE — 99204 OFFICE O/P NEW MOD 45 MIN: CPT | Performed by: INTERNAL MEDICINE

## 2025-05-20 RX ORDER — FAMOTIDINE 20 MG/1
TABLET, FILM COATED ORAL
COMMUNITY
Start: 2025-02-10

## 2025-05-20 ASSESSMENT — ENCOUNTER SYMPTOMS
RESPIRATORY NEGATIVE: 1
ALLERGIC/IMMUNOLOGIC NEGATIVE: 1

## 2025-07-28 ENCOUNTER — LAB (OUTPATIENT)
Dept: INTERNAL MEDICINE CLINIC | Facility: CLINIC | Age: 75
End: 2025-07-28

## 2025-07-28 DIAGNOSIS — M85.80 OSTEOPENIA, UNSPECIFIED LOCATION: ICD-10-CM

## 2025-07-28 DIAGNOSIS — M17.11 PRIMARY OSTEOARTHRITIS OF RIGHT KNEE: ICD-10-CM

## 2025-07-28 DIAGNOSIS — M85.89 OTHER SPECIFIED DISORDERS OF BONE DENSITY AND STRUCTURE, MULTIPLE SITES: ICD-10-CM

## 2025-07-28 DIAGNOSIS — F51.04 PSYCHOPHYSIOLOGICAL INSOMNIA: ICD-10-CM

## 2025-07-28 DIAGNOSIS — I10 PRIMARY HYPERTENSION: ICD-10-CM

## 2025-07-28 LAB
25(OH)D3 SERPL-MCNC: 89.7 NG/ML (ref 30–100)
ALBUMIN SERPL-MCNC: 3.3 G/DL (ref 3.2–4.6)
ALBUMIN/GLOB SERPL: 1 (ref 1–1.9)
ALP SERPL-CCNC: 64 U/L (ref 35–104)
ALT SERPL-CCNC: 16 U/L (ref 8–45)
ANION GAP SERPL CALC-SCNC: 11 MMOL/L (ref 7–16)
AST SERPL-CCNC: 18 U/L (ref 15–37)
BASOPHILS # BLD: 0.06 K/UL (ref 0–0.2)
BASOPHILS NFR BLD: 1.4 % (ref 0–2)
BILIRUB SERPL-MCNC: 0.5 MG/DL (ref 0–1.2)
BUN SERPL-MCNC: 17 MG/DL (ref 8–23)
CALCIUM SERPL-MCNC: 9.5 MG/DL (ref 8.8–10.2)
CHLORIDE SERPL-SCNC: 100 MMOL/L (ref 98–107)
CHOLEST SERPL-MCNC: 216 MG/DL (ref 0–200)
CO2 SERPL-SCNC: 27 MMOL/L (ref 20–29)
CREAT SERPL-MCNC: 0.93 MG/DL (ref 0.6–1.1)
DIFFERENTIAL METHOD BLD: NORMAL
EOSINOPHIL # BLD: 0.13 K/UL (ref 0–0.8)
EOSINOPHIL NFR BLD: 3.1 % (ref 0.5–7.8)
ERYTHROCYTE [DISTWIDTH] IN BLOOD BY AUTOMATED COUNT: 12.8 % (ref 11.9–14.6)
GLOBULIN SER CALC-MCNC: 3.2 G/DL (ref 2.3–3.5)
GLUCOSE SERPL-MCNC: 83 MG/DL (ref 70–99)
HCT VFR BLD AUTO: 39.1 % (ref 35.8–46.3)
HDLC SERPL-MCNC: 101 MG/DL (ref 40–60)
HDLC SERPL: 2.1 (ref 0–5)
HGB BLD-MCNC: 12.7 G/DL (ref 11.7–15.4)
IMM GRANULOCYTES # BLD AUTO: 0.01 K/UL (ref 0–0.5)
IMM GRANULOCYTES NFR BLD AUTO: 0.2 % (ref 0–5)
LDLC SERPL CALC-MCNC: 107 MG/DL (ref 0–100)
LYMPHOCYTES # BLD: 1.74 K/UL (ref 0.5–4.6)
LYMPHOCYTES NFR BLD: 40.8 % (ref 13–44)
MCH RBC QN AUTO: 29.6 PG (ref 26.1–32.9)
MCHC RBC AUTO-ENTMCNC: 32.5 G/DL (ref 31.4–35)
MCV RBC AUTO: 91.1 FL (ref 82–102)
MONOCYTES # BLD: 0.48 K/UL (ref 0.1–1.3)
MONOCYTES NFR BLD: 11.3 % (ref 4–12)
NEUTS SEG # BLD: 1.84 K/UL (ref 1.7–8.2)
NEUTS SEG NFR BLD: 43.2 % (ref 43–78)
NRBC # BLD: 0 K/UL (ref 0–0.2)
PLATELET # BLD AUTO: 247 K/UL (ref 150–450)
PMV BLD AUTO: 10.5 FL (ref 9.4–12.3)
POTASSIUM SERPL-SCNC: 4.2 MMOL/L (ref 3.5–5.1)
PROT SERPL-MCNC: 6.5 G/DL (ref 6.3–8.2)
RBC # BLD AUTO: 4.29 M/UL (ref 4.05–5.2)
SODIUM SERPL-SCNC: 138 MMOL/L (ref 136–145)
TRIGL SERPL-MCNC: 41 MG/DL (ref 0–150)
TSH W FREE THYROID IF ABNORMAL: 2.7 UIU/ML (ref 0.27–4.2)
VLDLC SERPL CALC-MCNC: 8 MG/DL (ref 6–23)
WBC # BLD AUTO: 4.3 K/UL (ref 4.3–11.1)

## 2025-07-28 SDOH — HEALTH STABILITY: PHYSICAL HEALTH: ON AVERAGE, HOW MANY MINUTES DO YOU ENGAGE IN EXERCISE AT THIS LEVEL?: 60 MIN

## 2025-07-28 SDOH — HEALTH STABILITY: PHYSICAL HEALTH: ON AVERAGE, HOW MANY DAYS PER WEEK DO YOU ENGAGE IN MODERATE TO STRENUOUS EXERCISE (LIKE A BRISK WALK)?: 2 DAYS

## 2025-07-28 ASSESSMENT — PATIENT HEALTH QUESTIONNAIRE - PHQ9
SUM OF ALL RESPONSES TO PHQ QUESTIONS 1-9: 0
SUM OF ALL RESPONSES TO PHQ QUESTIONS 1-9: 0
2. FEELING DOWN, DEPRESSED OR HOPELESS: NOT AT ALL
SUM OF ALL RESPONSES TO PHQ9 QUESTIONS 1 & 2: 0
2. FEELING DOWN, DEPRESSED OR HOPELESS: NOT AT ALL
SUM OF ALL RESPONSES TO PHQ QUESTIONS 1-9: 0
1. LITTLE INTEREST OR PLEASURE IN DOING THINGS: NOT AT ALL
SUM OF ALL RESPONSES TO PHQ QUESTIONS 1-9: 0
1. LITTLE INTEREST OR PLEASURE IN DOING THINGS: NOT AT ALL

## 2025-07-28 ASSESSMENT — LIFESTYLE VARIABLES
HOW OFTEN DURING THE LAST YEAR HAVE YOU HAD A FEELING OF GUILT OR REMORSE AFTER DRINKING: NEVER
HOW MANY STANDARD DRINKS CONTAINING ALCOHOL DO YOU HAVE ON A TYPICAL DAY: 1
HAVE YOU OR SOMEONE ELSE BEEN INJURED AS A RESULT OF YOUR DRINKING: NO
HAS A RELATIVE, FRIEND, DOCTOR, OR ANOTHER HEALTH PROFESSIONAL EXPRESSED CONCERN ABOUT YOUR DRINKING OR SUGGESTED YOU CUT DOWN: NO
HOW MANY STANDARD DRINKS CONTAINING ALCOHOL DO YOU HAVE ON A TYPICAL DAY: 1 OR 2
HOW OFTEN DO YOU HAVE A DRINK CONTAINING ALCOHOL: 5
HOW OFTEN DURING THE LAST YEAR HAVE YOU BEEN UNABLE TO REMEMBER WHAT HAPPENED THE NIGHT BEFORE BECAUSE YOU HAD BEEN DRINKING: NEVER
HOW OFTEN DO YOU HAVE SIX OR MORE DRINKS ON ONE OCCASION: 1
HOW OFTEN DURING THE LAST YEAR HAVE YOU HAD A FEELING OF GUILT OR REMORSE AFTER DRINKING: NEVER
HOW OFTEN DO YOU HAVE A DRINK CONTAINING ALCOHOL: 4 OR MORE TIMES A WEEK
HAS A RELATIVE, FRIEND, DOCTOR, OR ANOTHER HEALTH PROFESSIONAL EXPRESSED CONCERN ABOUT YOUR DRINKING OR SUGGESTED YOU CUT DOWN: NO
HOW OFTEN DURING THE LAST YEAR HAVE YOU NEEDED AN ALCOHOLIC DRINK FIRST THING IN THE MORNING TO GET YOURSELF GOING AFTER A NIGHT OF HEAVY DRINKING: NEVER
HOW OFTEN DURING THE LAST YEAR HAVE YOU FAILED TO DO WHAT WAS NORMALLY EXPECTED FROM YOU BECAUSE OF DRINKING: NEVER
HOW OFTEN DURING THE LAST YEAR HAVE YOU FOUND THAT YOU WERE NOT ABLE TO STOP DRINKING ONCE YOU HAD STARTED: NEVER
HOW OFTEN DURING THE LAST YEAR HAVE YOU BEEN UNABLE TO REMEMBER WHAT HAPPENED THE NIGHT BEFORE BECAUSE YOU HAD BEEN DRINKING: NEVER
HOW OFTEN DURING THE LAST YEAR HAVE YOU FOUND THAT YOU WERE NOT ABLE TO STOP DRINKING ONCE YOU HAD STARTED: NEVER
HAVE YOU OR SOMEONE ELSE BEEN INJURED AS A RESULT OF YOUR DRINKING: NO
HOW OFTEN DURING THE LAST YEAR HAVE YOU NEEDED AN ALCOHOLIC DRINK FIRST THING IN THE MORNING TO GET YOURSELF GOING AFTER A NIGHT OF HEAVY DRINKING: NEVER
HOW OFTEN DURING THE LAST YEAR HAVE YOU FAILED TO DO WHAT WAS NORMALLY EXPECTED FROM YOU BECAUSE OF DRINKING: NEVER

## 2025-08-01 ENCOUNTER — OFFICE VISIT (OUTPATIENT)
Dept: INTERNAL MEDICINE CLINIC | Facility: CLINIC | Age: 75
End: 2025-08-01

## 2025-08-01 VITALS
DIASTOLIC BLOOD PRESSURE: 50 MMHG | SYSTOLIC BLOOD PRESSURE: 112 MMHG | HEART RATE: 80 BPM | TEMPERATURE: 97.6 F | HEIGHT: 60 IN | BODY MASS INDEX: 25.52 KG/M2 | WEIGHT: 130 LBS | OXYGEN SATURATION: 98 %

## 2025-08-01 DIAGNOSIS — I10 PRIMARY HYPERTENSION: ICD-10-CM

## 2025-08-01 DIAGNOSIS — Z00.00 MEDICARE ANNUAL WELLNESS VISIT, SUBSEQUENT: Primary | ICD-10-CM

## 2025-08-01 DIAGNOSIS — G25.81 RESTLESS LEG SYNDROME: ICD-10-CM

## 2025-08-01 DIAGNOSIS — M85.80 OSTEOPENIA, UNSPECIFIED LOCATION: ICD-10-CM

## 2025-08-01 RX ORDER — RALOXIFENE HYDROCHLORIDE 60 MG/1
60 TABLET, FILM COATED ORAL DAILY
Qty: 90 TABLET | Refills: 3 | Status: SHIPPED | OUTPATIENT
Start: 2025-08-01

## 2025-08-01 RX ORDER — IRBESARTAN AND HYDROCHLOROTHIAZIDE 300; 12.5 MG/1; MG/1
1 TABLET, FILM COATED ORAL DAILY
Qty: 90 TABLET | Refills: 3 | Status: SHIPPED | OUTPATIENT
Start: 2025-08-01

## 2025-08-01 RX ORDER — OMEPRAZOLE 20 MG/1
20 CAPSULE, DELAYED RELEASE ORAL
Qty: 90 CAPSULE | Refills: 3 | Status: SHIPPED | OUTPATIENT
Start: 2025-08-01

## 2025-08-01 RX ORDER — TRAZODONE HYDROCHLORIDE 50 MG/1
TABLET ORAL
Qty: 90 TABLET | Refills: 3 | Status: SHIPPED | OUTPATIENT
Start: 2025-08-01

## 2025-08-01 NOTE — PATIENT INSTRUCTIONS
Learning About Being Active as an Older Adult  Why is being active important as you get older?     Being active is one of the best things you can do for your health. And it's never too late to start. Being active--or getting active, if you aren't already--has definite benefits. It can:  Give you more energy,  Keep your mind sharp.  Improve balance to reduce your risk of falls.  Help you manage chronic illness with fewer medicines.  No matter how old you are, how fit you are, or what health problems you have, there is a form of activity that will work for you. And the more physical activity you can do, the better your overall health will be.  What kinds of activity can help you stay healthy?  Being more active will make your daily activities easier. Physical activity includes planned exercise and things you do in daily life. There are four types of activity:  Aerobic.  Doing aerobic activity makes your heart and lungs strong.  Includes walking, dancing, and gardening.  Aim for at least 2½ hours spread throughout the week.  It improves your energy and can help you sleep better.  Muscle-strengthening.  This type of activity can help maintain muscle and strengthen bones.  Includes climbing stairs, using resistance bands, and lifting or carrying heavy loads.  Aim for at least twice a week.  It can help protect the knees and other joints.  Stretching.  Stretching gives you better range of motion in joints and muscles.  Includes upper arm stretches, calf stretches, and gentle yoga.  Aim for at least twice a week, preferably after your muscles are warmed up from other activities.  It can help you function better in daily life.  Balancing.  This helps you stay coordinated and have good posture.  Includes heel-to-toe walking, victor manuel chi, and certain types of yoga.  Aim for at least 3 days a week.  It can reduce your risk of falling.  Even if you have a hard time meeting the recommendations, it's better to be more active

## 2025-08-01 NOTE — PROGRESS NOTES
Josey Black (: 1950) is a 75 y.o. female, here for evaluation of the following chief complaint(s):  Follow-up (Yearly check up and review labs) and Medicare AWV     Assessment & Plan  1. Post-knee replacement status.  - Reports no pain and good function following knee replacement surgery on 08/15/2024.  - Physical therapy has been completed; able to walk and climb stairs without issues.  - No medications are currently being taken for the knee.  - Plans to increase physical activity, including walking and playing golf.    2. Acid reflux.  - Takes omeprazole for acid reflux and occasionally uses Pepcid.  - Experienced discomfort after consuming garlic oil pasta; plans to avoid it in the future.  - No new medications or changes in treatment discussed.    3. Mild sleep apnea.  - Sleep study indicated mild sleep apnea with an oxygen level drop to 84% once.  - Advised to try sleep positioning, such as sleeping on sides or stomach, and to use a sleep noodle to prevent rolling onto back.  - An overnight oximetry test is scheduled; follow-up with pulmonologist in October.    4. Hypertension.  - Blood pressure readings have been around 112/50, which is within normal range.  - Previously reduced irbesartan dosage but has since resumed full dose.  - If experiencing dizziness or weakness, advised to halve blood pressure medication dosage.  - No new medications or changes in treatment discussed.    5. Arthritis.  - Has arthritis in back; occasionally takes ibuprofen for pain relief.  - Plans to increase physical activity, including walking and playing golf.  - No new medications or changes in treatment discussed.    6. Osteoporosis.  - Bone density test from 2023 showed borderline results.  - Currently taking Evista for osteoporosis.  - No new medications or changes in treatment discussed.    7. Dense breast tissue.  - Recent mammogram from 2025 showed benign fibrous dense tissue.  - Plans to discuss possibility